# Patient Record
Sex: MALE | Race: WHITE | NOT HISPANIC OR LATINO | URBAN - METROPOLITAN AREA
[De-identification: names, ages, dates, MRNs, and addresses within clinical notes are randomized per-mention and may not be internally consistent; named-entity substitution may affect disease eponyms.]

---

## 2017-01-12 PROBLEM — Z00.00 ENCOUNTER FOR PREVENTIVE HEALTH EXAMINATION: Status: ACTIVE | Noted: 2017-01-12

## 2017-01-17 ENCOUNTER — OUTPATIENT (OUTPATIENT)
Dept: OUTPATIENT SERVICES | Facility: HOSPITAL | Age: 52
LOS: 1 days | End: 2017-01-17
Payer: MEDICARE

## 2017-01-17 VITALS
TEMPERATURE: 98 F | HEIGHT: 68 IN | SYSTOLIC BLOOD PRESSURE: 120 MMHG | DIASTOLIC BLOOD PRESSURE: 78 MMHG | WEIGHT: 315 LBS | RESPIRATION RATE: 16 BRPM | HEART RATE: 80 BPM

## 2017-01-17 DIAGNOSIS — Z98.890 OTHER SPECIFIED POSTPROCEDURAL STATES: Chronic | ICD-10-CM

## 2017-01-17 DIAGNOSIS — K42.9 UMBILICAL HERNIA WITHOUT OBSTRUCTION OR GANGRENE: ICD-10-CM

## 2017-01-17 DIAGNOSIS — K43.9 VENTRAL HERNIA WITHOUT OBSTRUCTION OR GANGRENE: ICD-10-CM

## 2017-01-17 DIAGNOSIS — Z01.818 ENCOUNTER FOR OTHER PREPROCEDURAL EXAMINATION: ICD-10-CM

## 2017-01-17 LAB
ANION GAP SERPL CALC-SCNC: 4 MMOL/L — LOW (ref 5–17)
BUN SERPL-MCNC: 21 MG/DL — SIGNIFICANT CHANGE UP (ref 7–23)
CALCIUM SERPL-MCNC: 9 MG/DL — SIGNIFICANT CHANGE UP (ref 8.4–10.5)
CHLORIDE SERPL-SCNC: 103 MMOL/L — SIGNIFICANT CHANGE UP (ref 96–108)
CO2 SERPL-SCNC: 32 MMOL/L — HIGH (ref 22–31)
CREAT SERPL-MCNC: 1.05 MG/DL — SIGNIFICANT CHANGE UP (ref 0.5–1.3)
GLUCOSE SERPL-MCNC: 177 MG/DL — HIGH (ref 70–99)
HBA1C BLD-MCNC: 7.4 % — HIGH (ref 4–5.6)
HCT VFR BLD CALC: 41.7 % — SIGNIFICANT CHANGE UP (ref 39–50)
HGB BLD-MCNC: 13.4 G/DL — SIGNIFICANT CHANGE UP (ref 13–17)
MCHC RBC-ENTMCNC: 28 PG — SIGNIFICANT CHANGE UP (ref 27–34)
MCHC RBC-ENTMCNC: 32.3 GM/DL — SIGNIFICANT CHANGE UP (ref 32–36)
MCV RBC AUTO: 86.6 FL — SIGNIFICANT CHANGE UP (ref 80–100)
PLATELET # BLD AUTO: 214 K/UL — SIGNIFICANT CHANGE UP (ref 150–400)
POTASSIUM SERPL-MCNC: 4.7 MMOL/L — SIGNIFICANT CHANGE UP (ref 3.5–5.3)
POTASSIUM SERPL-SCNC: 4.7 MMOL/L — SIGNIFICANT CHANGE UP (ref 3.5–5.3)
RBC # BLD: 4.81 M/UL — SIGNIFICANT CHANGE UP (ref 4.2–5.8)
RBC # FLD: 12.5 % — SIGNIFICANT CHANGE UP (ref 10.3–14.5)
SODIUM SERPL-SCNC: 139 MMOL/L — SIGNIFICANT CHANGE UP (ref 135–145)
WBC # BLD: 6.3 K/UL — SIGNIFICANT CHANGE UP (ref 3.8–10.5)
WBC # FLD AUTO: 6.3 K/UL — SIGNIFICANT CHANGE UP (ref 3.8–10.5)

## 2017-01-17 PROCEDURE — 36415 COLL VENOUS BLD VENIPUNCTURE: CPT

## 2017-01-17 PROCEDURE — 83036 HEMOGLOBIN GLYCOSYLATED A1C: CPT

## 2017-01-17 PROCEDURE — 93010 ELECTROCARDIOGRAM REPORT: CPT

## 2017-01-17 PROCEDURE — G0463: CPT

## 2017-01-17 PROCEDURE — 93005 ELECTROCARDIOGRAM TRACING: CPT

## 2017-01-17 PROCEDURE — 80048 BASIC METABOLIC PNL TOTAL CA: CPT

## 2017-01-17 PROCEDURE — 85027 COMPLETE CBC AUTOMATED: CPT

## 2017-01-17 NOTE — H&P PST ADULT - PMH
Abrasion of ankle    Cervical disc herniation  C2,3,4,6 and 7, under pain management and chiropractic care  Hyperlipidemia    Hypertension    Leg pain  left lower leg  Lumbar disc herniation  x2, unsure levels  Morbid obesity    MACO on CPAP  settings unknown  Prediabetes    Umbilical hernia
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
## 2017-01-17 NOTE — H&P PST ADULT - PROBLEM SELECTOR PLAN 1
umbilical hernia repair. medical clearance requested. lisinopril AM of surgery with sips of water. pepcid and surgical wash instructions reviewed and verbalized understanding

## 2017-01-17 NOTE — H&P PST ADULT - FAMILY HISTORY
Mother  Still living? No  Family history of pancreatic cancer, Age at diagnosis: Age Unknown     Father  Still living? Yes, Estimated age: 71-80  Family history of coronary artery bypass graft, Age at diagnosis: Age Unknown  Family history of type 2 diabetes mellitus, Age at diagnosis: Age Unknown

## 2017-01-17 NOTE — H&P PST ADULT - PSH
S/P carpal tunnel release  2008 left with ulnar nerve transposition  S/P carpal tunnel release  right 2012 with ulnar nerve transposition

## 2017-01-17 NOTE — H&P PST ADULT - MUSCULOSKELETAL
details… detailed exam no joint erythema/diminished strength/no joint warmth/bilateral upper extremities/no joint swelling/ROM intact/no calf tenderness

## 2017-01-17 NOTE — H&P PST ADULT - RS GEN PE MLT RESP DETAILS PC
clear to auscultation bilaterally/good air movement/no rhonchi/respirations non-labored/no intercostal retractions/airway patent/breath sounds equal/no chest wall tenderness/no wheezes/no rales

## 2017-01-17 NOTE — H&P PST ADULT - NEUROLOGICAL DETAILS
responds to verbal commands/responds to pain/bilateral upper extremities with 4/5 sensation/alert and oriented x 3/strength decreased

## 2017-01-19 NOTE — ASU DISCHARGE PLAN (ADULT/PEDIATRIC). - ACTIVITY LEVEL
no sports/gym/no tub baths/no heavy lifting no heavy lifting/abdominal binder while up and around/no tub baths/no sports/gym

## 2017-01-19 NOTE — ASU DISCHARGE PLAN (ADULT/PEDIATRIC). - MEDICATION SUMMARY - MEDICATIONS TO TAKE
I will START or STAY ON the medications listed below when I get home from the hospital:    acetaminophen-oxyCODONE 325 mg-5 mg oral tablet  -- 1 tab(s) by mouth every 6 hours, As Needed -for severe pain MDD:4  -- Caution federal law prohibits the transfer of this drug to any person other  than the person for whom it was prescribed.  May cause drowsiness.  Alcohol may intensify this effect.  Use care when operating dangerous machinery.  This prescription cannot be refilled.  This product contains acetaminophen.  Do not use  with any other product containing acetaminophen to prevent possible liver damage.  Using more of this medication than prescribed may cause serious breathing problems.    -- Indication: For moderate to severe incisional pain    aspirin 81 mg oral tablet  -- 1 tab(s) by mouth once a day  -- Indication: For continuing home medications    lisinopril 10 mg oral tablet  -- 1 tab(s) by mouth once a day  -- Indication: For continuing home medications    Allegra 180 mg oral tablet  -- 1 tab(s) by mouth once a day  -- Indication: For continuing home medications    atorvastatin 10 mg oral tablet  -- 1 tab(s) by mouth once a day  -- Indication: For continuing home medications    Glucosamine & Chondroitin with MSM  -- 2 tab(s) by mouth once a day  -- Indication: For continuing home medications    multivitamin  -- 1 tab(s) by mouth once a day  -- Indication: For continuing home medications

## 2017-01-19 NOTE — ASU DISCHARGE PLAN (ADULT/PEDIATRIC). - NOTIFY
Fever greater than 101/Persistent Nausea and Vomiting/Swelling that continues/Unable to Urinate/Bleeding that does not stop/Pain not relieved by Medications

## 2017-01-19 NOTE — ASU DISCHARGE PLAN (ADULT/PEDIATRIC). - MEDICATION SUMMARY - MEDICATIONS TO STOP TAKING
I will STOP taking the medications listed below when I get home from the hospital:    Percocet 10/325 oral tablet  -- 1 tab(s) by mouth every 6 hours, As Needed - for moderate pain    Pepcid 20 mg oral tablet  --  by mouth twice, preoperatively

## 2017-01-19 NOTE — ASU DISCHARGE PLAN (ADULT/PEDIATRIC). - SPECIAL INSTRUCTIONS
apply ice to operative site 20 minutes on and 20 minutes off for next 48 hours  Pain meds as per MD  Take an over the counter stool softener like Colace to avoid constipation while taking a narcotic for pain apply ice to operative site 20 minutes on and 20 minutes off for next 48 hours  Pain meds as per MD  Take an over the counter stool softener like Colace to avoid constipation while taking a narcotic for pain  wear abdominal binder when up and around

## 2017-01-22 RX ORDER — FAMOTIDINE 10 MG/ML
0 INJECTION INTRAVENOUS
Qty: 0 | Refills: 0 | COMMUNITY
Start: 2017-01-22 | End: 2017-01-23

## 2017-01-23 ENCOUNTER — OUTPATIENT (OUTPATIENT)
Dept: OUTPATIENT SERVICES | Facility: HOSPITAL | Age: 52
LOS: 1 days | End: 2017-01-23
Payer: MEDICARE

## 2017-01-23 VITALS
DIASTOLIC BLOOD PRESSURE: 56 MMHG | RESPIRATION RATE: 14 BRPM | HEART RATE: 82 BPM | OXYGEN SATURATION: 94 % | SYSTOLIC BLOOD PRESSURE: 101 MMHG

## 2017-01-23 VITALS
TEMPERATURE: 98 F | SYSTOLIC BLOOD PRESSURE: 142 MMHG | OXYGEN SATURATION: 97 % | RESPIRATION RATE: 17 BRPM | WEIGHT: 315 LBS | HEIGHT: 68 IN | DIASTOLIC BLOOD PRESSURE: 68 MMHG | HEART RATE: 67 BPM

## 2017-01-23 DIAGNOSIS — K43.9 VENTRAL HERNIA WITHOUT OBSTRUCTION OR GANGRENE: ICD-10-CM

## 2017-01-23 DIAGNOSIS — Z98.890 OTHER SPECIFIED POSTPROCEDURAL STATES: Chronic | ICD-10-CM

## 2017-01-23 DIAGNOSIS — Z01.818 ENCOUNTER FOR OTHER PREPROCEDURAL EXAMINATION: ICD-10-CM

## 2017-01-23 PROCEDURE — 49560: CPT

## 2017-01-23 PROCEDURE — C1781: CPT

## 2017-01-23 PROCEDURE — 49568: CPT

## 2017-01-23 RX ORDER — LISINOPRIL 2.5 MG/1
1 TABLET ORAL
Qty: 0 | Refills: 0 | COMMUNITY

## 2017-01-23 RX ORDER — OXYCODONE HYDROCHLORIDE 5 MG/1
1 TABLET ORAL
Qty: 28 | Refills: 0 | OUTPATIENT
Start: 2017-01-23 | End: 2017-01-30

## 2017-01-23 RX ORDER — ATORVASTATIN CALCIUM 80 MG/1
1 TABLET, FILM COATED ORAL
Qty: 0 | Refills: 0 | COMMUNITY

## 2017-01-23 RX ORDER — CEFAZOLIN SODIUM 1 G
3000 VIAL (EA) INJECTION ONCE
Qty: 0 | Refills: 0 | Status: COMPLETED | OUTPATIENT
Start: 2017-01-23 | End: 2017-01-23

## 2017-01-23 RX ORDER — SODIUM CHLORIDE 9 MG/ML
1000 INJECTION, SOLUTION INTRAVENOUS
Qty: 0 | Refills: 0 | Status: DISCONTINUED | OUTPATIENT
Start: 2017-01-23 | End: 2017-01-24

## 2017-01-23 RX ORDER — FEXOFENADINE HCL 30 MG
1 TABLET ORAL
Qty: 0 | Refills: 0 | COMMUNITY

## 2017-01-23 RX ORDER — ASPIRIN/CALCIUM CARB/MAGNESIUM 324 MG
1 TABLET ORAL
Qty: 0 | Refills: 0 | COMMUNITY

## 2017-01-23 RX ORDER — HYDROMORPHONE HYDROCHLORIDE 2 MG/ML
0.5 INJECTION INTRAMUSCULAR; INTRAVENOUS; SUBCUTANEOUS
Qty: 0 | Refills: 0 | Status: DISCONTINUED | OUTPATIENT
Start: 2017-01-23 | End: 2017-01-24

## 2017-01-23 RX ORDER — GLUCOSAMINE/MSM/CHONDROITIN A 750-375MG
2 TABLET ORAL
Qty: 0 | Refills: 0 | COMMUNITY

## 2017-01-23 RX ADMIN — HYDROMORPHONE HYDROCHLORIDE 0.5 MILLIGRAM(S): 2 INJECTION INTRAMUSCULAR; INTRAVENOUS; SUBCUTANEOUS at 11:47

## 2017-01-23 RX ADMIN — HYDROMORPHONE HYDROCHLORIDE 0.5 MILLIGRAM(S): 2 INJECTION INTRAMUSCULAR; INTRAVENOUS; SUBCUTANEOUS at 12:05

## 2017-01-23 RX ADMIN — HYDROMORPHONE HYDROCHLORIDE 0.5 MILLIGRAM(S): 2 INJECTION INTRAMUSCULAR; INTRAVENOUS; SUBCUTANEOUS at 12:06

## 2017-01-23 RX ADMIN — HYDROMORPHONE HYDROCHLORIDE 0.5 MILLIGRAM(S): 2 INJECTION INTRAMUSCULAR; INTRAVENOUS; SUBCUTANEOUS at 11:44

## 2017-01-23 RX ADMIN — SODIUM CHLORIDE 100 MILLILITER(S): 9 INJECTION, SOLUTION INTRAVENOUS at 11:47

## 2017-01-23 RX ADMIN — HYDROMORPHONE HYDROCHLORIDE 0.5 MILLIGRAM(S): 2 INJECTION INTRAMUSCULAR; INTRAVENOUS; SUBCUTANEOUS at 12:24

## 2017-01-23 RX ADMIN — HYDROMORPHONE HYDROCHLORIDE 0.5 MILLIGRAM(S): 2 INJECTION INTRAMUSCULAR; INTRAVENOUS; SUBCUTANEOUS at 11:50

## 2017-10-26 PROBLEM — I10 ESSENTIAL (PRIMARY) HYPERTENSION: Chronic | Status: ACTIVE | Noted: 2017-01-17

## 2017-10-26 PROBLEM — M50.20 OTHER CERVICAL DISC DISPLACEMENT, UNSPECIFIED CERVICAL REGION: Chronic | Status: ACTIVE | Noted: 2017-01-17

## 2017-10-26 PROBLEM — E66.01 MORBID (SEVERE) OBESITY DUE TO EXCESS CALORIES: Chronic | Status: ACTIVE | Noted: 2017-01-17

## 2017-10-26 PROBLEM — E78.5 HYPERLIPIDEMIA, UNSPECIFIED: Chronic | Status: ACTIVE | Noted: 2017-01-17

## 2017-10-26 PROBLEM — M51.26 OTHER INTERVERTEBRAL DISC DISPLACEMENT, LUMBAR REGION: Chronic | Status: ACTIVE | Noted: 2017-01-17

## 2017-10-26 PROBLEM — M79.606 PAIN IN LEG, UNSPECIFIED: Chronic | Status: ACTIVE | Noted: 2017-01-17

## 2017-10-26 PROBLEM — K42.9 UMBILICAL HERNIA WITHOUT OBSTRUCTION OR GANGRENE: Chronic | Status: ACTIVE | Noted: 2017-01-17

## 2017-10-26 PROBLEM — R73.03 PREDIABETES: Chronic | Status: ACTIVE | Noted: 2017-01-17

## 2017-10-26 PROBLEM — S90.519A: Chronic | Status: ACTIVE | Noted: 2017-01-17

## 2017-10-26 PROBLEM — G47.33 OBSTRUCTIVE SLEEP APNEA (ADULT) (PEDIATRIC): Chronic | Status: ACTIVE | Noted: 2017-01-17

## 2017-11-06 ENCOUNTER — APPOINTMENT (OUTPATIENT)
Dept: FAMILY MEDICINE | Facility: CLINIC | Age: 52
End: 2017-11-06
Payer: MEDICARE

## 2017-11-06 VITALS
DIASTOLIC BLOOD PRESSURE: 102 MMHG | HEIGHT: 67 IN | WEIGHT: 315 LBS | SYSTOLIC BLOOD PRESSURE: 134 MMHG | HEART RATE: 80 BPM | OXYGEN SATURATION: 98 % | RESPIRATION RATE: 16 BRPM | BODY MASS INDEX: 49.44 KG/M2

## 2017-11-06 DIAGNOSIS — Z86.39 PERSONAL HISTORY OF OTHER ENDOCRINE, NUTRITIONAL AND METABOLIC DISEASE: ICD-10-CM

## 2017-11-06 DIAGNOSIS — Z80.0 FAMILY HISTORY OF MALIGNANT NEOPLASM OF DIGESTIVE ORGANS: ICD-10-CM

## 2017-11-06 DIAGNOSIS — M50.20 OTHER CERVICAL DISC DISPLACEMENT, UNSPECIFIED CERVICAL REGION: ICD-10-CM

## 2017-11-06 DIAGNOSIS — M51.16 INTERVERTEBRAL DISC DISORDERS WITH RADICULOPATHY, LUMBAR REGION: ICD-10-CM

## 2017-11-06 DIAGNOSIS — Z78.9 OTHER SPECIFIED HEALTH STATUS: ICD-10-CM

## 2017-11-06 PROCEDURE — 99204 OFFICE O/P NEW MOD 45 MIN: CPT

## 2017-11-24 ENCOUNTER — RX RENEWAL (OUTPATIENT)
Age: 52
End: 2017-11-24

## 2017-12-19 ENCOUNTER — APPOINTMENT (OUTPATIENT)
Dept: FAMILY MEDICINE | Facility: CLINIC | Age: 52
End: 2017-12-19
Payer: MEDICARE

## 2017-12-19 VITALS
HEART RATE: 84 BPM | DIASTOLIC BLOOD PRESSURE: 92 MMHG | BODY MASS INDEX: 49.44 KG/M2 | HEIGHT: 67 IN | SYSTOLIC BLOOD PRESSURE: 142 MMHG | OXYGEN SATURATION: 99 % | WEIGHT: 315 LBS

## 2017-12-19 PROCEDURE — 99213 OFFICE O/P EST LOW 20 MIN: CPT

## 2018-01-27 ENCOUNTER — RX RENEWAL (OUTPATIENT)
Age: 53
End: 2018-01-27

## 2018-01-30 ENCOUNTER — RX RENEWAL (OUTPATIENT)
Age: 53
End: 2018-01-30

## 2018-03-17 ENCOUNTER — APPOINTMENT (OUTPATIENT)
Dept: FAMILY MEDICINE | Facility: CLINIC | Age: 53
End: 2018-03-17
Payer: MEDICARE

## 2018-03-17 VITALS
DIASTOLIC BLOOD PRESSURE: 90 MMHG | SYSTOLIC BLOOD PRESSURE: 140 MMHG | OXYGEN SATURATION: 98 % | RESPIRATION RATE: 12 BRPM | HEART RATE: 70 BPM

## 2018-03-17 PROCEDURE — 99213 OFFICE O/P EST LOW 20 MIN: CPT

## 2018-03-17 RX ORDER — LOSARTAN POTASSIUM 25 MG/1
25 TABLET, FILM COATED ORAL
Qty: 90 | Refills: 0 | Status: DISCONTINUED | COMMUNITY
Start: 2018-01-30 | End: 2018-03-17

## 2018-05-23 ENCOUNTER — APPOINTMENT (OUTPATIENT)
Dept: FAMILY MEDICINE | Facility: CLINIC | Age: 53
End: 2018-05-23

## 2018-05-24 ENCOUNTER — APPOINTMENT (OUTPATIENT)
Dept: FAMILY MEDICINE | Facility: CLINIC | Age: 53
End: 2018-05-24
Payer: MEDICARE

## 2018-05-24 VITALS
DIASTOLIC BLOOD PRESSURE: 88 MMHG | SYSTOLIC BLOOD PRESSURE: 126 MMHG | WEIGHT: 315 LBS | HEART RATE: 93 BPM | HEIGHT: 67 IN | OXYGEN SATURATION: 96 % | RESPIRATION RATE: 14 BRPM | BODY MASS INDEX: 49.44 KG/M2

## 2018-05-24 DIAGNOSIS — M16.11 UNILATERAL PRIMARY OSTEOARTHRITIS, RIGHT HIP: ICD-10-CM

## 2018-05-24 LAB — PODIATRY EVAL: NORMAL

## 2018-05-24 PROCEDURE — 99213 OFFICE O/P EST LOW 20 MIN: CPT

## 2018-05-24 NOTE — HISTORY OF PRESENT ILLNESS
[FreeTextEntry8] : +R hip pain radiates to R buttock, and down R leg  X worsening over last 3 years

## 2018-05-24 NOTE — PHYSICAL EXAM
[No Acute Distress] : no acute distress [Well-Appearing] : well-appearing [No JVD] : no jugular venous distention [No Lymphadenopathy] : no lymphadenopathy [No Respiratory Distress] : no respiratory distress  [Clear to Auscultation] : lungs were clear to auscultation bilaterally [Normal Rate] : normal rate  [Regular Rhythm] : with a regular rhythm [No Carotid Bruits] : no carotid bruits [Normal Posterior Cervical Nodes] : no posterior cervical lymphadenopathy [Normal Anterior Cervical Nodes] : no anterior cervical lymphadenopathy [de-identified] : obese [de-identified] : tender in posterior neck [de-identified] : tender in right hip, antalgic gait

## 2018-05-24 NOTE — REVIEW OF SYSTEMS
[Joint Pain] : joint pain [Joint Stiffness] : joint stiffness [Back Pain] : back pain [Negative] : Heme/Lymph [FreeTextEntry9] : neck pain

## 2018-07-26 ENCOUNTER — RX RENEWAL (OUTPATIENT)
Age: 53
End: 2018-07-26

## 2018-07-30 ENCOUNTER — APPOINTMENT (OUTPATIENT)
Dept: FAMILY MEDICINE | Facility: CLINIC | Age: 53
End: 2018-07-30
Payer: MEDICARE

## 2018-07-30 VITALS
RESPIRATION RATE: 14 BRPM | DIASTOLIC BLOOD PRESSURE: 76 MMHG | HEART RATE: 86 BPM | BODY MASS INDEX: 49.44 KG/M2 | SYSTOLIC BLOOD PRESSURE: 122 MMHG | HEIGHT: 67 IN | WEIGHT: 315 LBS

## 2018-07-30 PROCEDURE — 99213 OFFICE O/P EST LOW 20 MIN: CPT

## 2018-07-30 NOTE — HISTORY OF PRESENT ILLNESS
[FreeTextEntry8] : +red, +swollen, +painful R leg from calf down to ankle X 4 days  Seen at [facility] MD - venous doppler was negative. placed on avelox

## 2018-07-30 NOTE — REVIEW OF SYSTEMS
[Joint Pain] : joint pain [Joint Stiffness] : joint stiffness [Negative] : Heme/Lymph [de-identified] : redness of left lower leg

## 2018-07-30 NOTE — PHYSICAL EXAM
[No Acute Distress] : no acute distress [Well-Appearing] : well-appearing [No JVD] : no jugular venous distention [Supple] : supple [No Lymphadenopathy] : no lymphadenopathy [No Respiratory Distress] : no respiratory distress  [Clear to Auscultation] : lungs were clear to auscultation bilaterally [Normal Rate] : normal rate  [Regular Rhythm] : with a regular rhythm [No Carotid Bruits] : no carotid bruits [Normal Posterior Cervical Nodes] : no posterior cervical lymphadenopathy [Normal Anterior Cervical Nodes] : no anterior cervical lymphadenopathy [de-identified] : obese [de-identified] : chronic stasis dermatitis-  redness of left lowere leg, increased calor

## 2018-07-30 NOTE — ASSESSMENT
[FreeTextEntry1] : 1. For better coverage will change to levaquin 500 mg 1/day x 10 days and hold atorvastatin. Stressed needs to follow up with vascular.  2. Stressed needs to work on losing wt - very important.

## 2018-10-24 ENCOUNTER — RX RENEWAL (OUTPATIENT)
Age: 53
End: 2018-10-24

## 2018-11-02 ENCOUNTER — RX RENEWAL (OUTPATIENT)
Age: 53
End: 2018-11-02

## 2018-11-02 RX ORDER — LEVOFLOXACIN 500 MG/1
500 TABLET, FILM COATED ORAL
Qty: 10 | Refills: 0 | Status: DISCONTINUED | COMMUNITY
Start: 2018-07-30 | End: 2018-11-02

## 2018-11-02 RX ORDER — CEFADROXIL 500 MG/1
500 CAPSULE ORAL TWICE DAILY
Qty: 20 | Refills: 0 | Status: DISCONTINUED | COMMUNITY
Start: 2018-03-17 | End: 2018-11-02

## 2018-11-24 ENCOUNTER — RX RENEWAL (OUTPATIENT)
Age: 53
End: 2018-11-24

## 2018-12-22 ENCOUNTER — RX RENEWAL (OUTPATIENT)
Age: 53
End: 2018-12-22

## 2019-01-14 ENCOUNTER — RX RENEWAL (OUTPATIENT)
Age: 54
End: 2019-01-14

## 2019-01-15 ENCOUNTER — RX RENEWAL (OUTPATIENT)
Age: 54
End: 2019-01-15

## 2019-01-21 ENCOUNTER — APPOINTMENT (OUTPATIENT)
Dept: FAMILY MEDICINE | Facility: CLINIC | Age: 54
End: 2019-01-21
Payer: MEDICARE

## 2019-01-21 VITALS — HEART RATE: 79 BPM | DIASTOLIC BLOOD PRESSURE: 80 MMHG | OXYGEN SATURATION: 79 % | SYSTOLIC BLOOD PRESSURE: 128 MMHG

## 2019-01-21 PROCEDURE — 99213 OFFICE O/P EST LOW 20 MIN: CPT | Mod: 25

## 2019-01-21 PROCEDURE — 36415 COLL VENOUS BLD VENIPUNCTURE: CPT

## 2019-01-21 RX ORDER — MOXIFLOXACIN HYDROCHLORIDE TABLETS, 400 MG 400 MG/1
400 TABLET, FILM COATED ORAL
Qty: 7 | Refills: 0 | Status: DISCONTINUED | COMMUNITY
Start: 2018-07-29 | End: 2019-01-21

## 2019-01-21 RX ORDER — SILVER SULFADIAZINE 10 MG/G
1 CREAM TOPICAL
Qty: 400 | Refills: 0 | Status: DISCONTINUED | COMMUNITY
Start: 2017-11-22 | End: 2019-01-21

## 2019-01-21 NOTE — HISTORY OF PRESENT ILLNESS
[de-identified] : He needs a new pain management doctor - both herniated cervical and lumbar discs. Here for management of HTN, NIDDM and hyperlipidemia.  [FreeTextEntry1] : Pt presents for med check

## 2019-01-21 NOTE — PHYSICAL EXAM
[No Acute Distress] : no acute distress [Well-Appearing] : well-appearing [No JVD] : no jugular venous distention [No Lymphadenopathy] : no lymphadenopathy [No Respiratory Distress] : no respiratory distress  [Clear to Auscultation] : lungs were clear to auscultation bilaterally [Normal Rate] : normal rate  [Regular Rhythm] : with a regular rhythm [No Murmur] : no murmur heard [No Carotid Bruits] : no carotid bruits [Normal Posterior Cervical Nodes] : no posterior cervical lymphadenopathy [Normal Anterior Cervical Nodes] : no anterior cervical lymphadenopathy [Speech Grossly Normal] : speech grossly normal [Memory Grossly Normal] : memory grossly normal [Normal Affect] : the affect was normal [Alert and Oriented x3] : oriented to person, place, and time [Normal Mood] : the mood was normal [Normal Insight/Judgement] : insight and judgment were intact [de-identified] : obese [de-identified] : painful ROM [de-identified] : tender in lumbar spine, pain with ROM

## 2019-01-21 NOTE — HISTORY OF PRESENT ILLNESS
[de-identified] : He needs a new pain management doctor - both herniated cervical and lumbar discs. Here for management of HTN, NIDDM and hyperlipidemia.  [FreeTextEntry1] : Pt presents for med check

## 2019-01-21 NOTE — HEALTH RISK ASSESSMENT
[] : No [No falls in past year] : Patient reported no falls in the past year [0] : 2) Feeling down, depressed, or hopeless: Not at all (0) [de-identified] : rare [[id number]] : 0

## 2019-01-21 NOTE — PHYSICAL EXAM
[No Acute Distress] : no acute distress [Well-Appearing] : well-appearing [No JVD] : no jugular venous distention [No Lymphadenopathy] : no lymphadenopathy [No Respiratory Distress] : no respiratory distress  [Clear to Auscultation] : lungs were clear to auscultation bilaterally [Normal Rate] : normal rate  [Regular Rhythm] : with a regular rhythm [No Murmur] : no murmur heard [No Carotid Bruits] : no carotid bruits [Normal Posterior Cervical Nodes] : no posterior cervical lymphadenopathy [Normal Anterior Cervical Nodes] : no anterior cervical lymphadenopathy [Speech Grossly Normal] : speech grossly normal [Memory Grossly Normal] : memory grossly normal [Normal Affect] : the affect was normal [Alert and Oriented x3] : oriented to person, place, and time [Normal Mood] : the mood was normal [Normal Insight/Judgement] : insight and judgment were intact [de-identified] : obese [de-identified] : painful ROM [de-identified] : tender in lumbar spine, pain with ROM

## 2019-01-21 NOTE — PHYSICAL EXAM
[No Acute Distress] : no acute distress [Well-Appearing] : well-appearing [No JVD] : no jugular venous distention [No Lymphadenopathy] : no lymphadenopathy [No Respiratory Distress] : no respiratory distress  [Clear to Auscultation] : lungs were clear to auscultation bilaterally [Normal Rate] : normal rate  [Regular Rhythm] : with a regular rhythm [No Murmur] : no murmur heard [No Carotid Bruits] : no carotid bruits [Normal Posterior Cervical Nodes] : no posterior cervical lymphadenopathy [Normal Anterior Cervical Nodes] : no anterior cervical lymphadenopathy [Speech Grossly Normal] : speech grossly normal [Memory Grossly Normal] : memory grossly normal [Normal Affect] : the affect was normal [Alert and Oriented x3] : oriented to person, place, and time [Normal Mood] : the mood was normal [Normal Insight/Judgement] : insight and judgment were intact [de-identified] : obese [de-identified] : painful ROM [de-identified] : tender in lumbar spine, pain with ROM

## 2019-01-21 NOTE — ASSESSMENT
[FreeTextEntry1] : 1. HTN- BP - 128/80 is well controlled and all meds were renewed. 2. , 3. and 4. Diet/losing wt/medication use were all reviewed.  Lab done here to assess status.  Referral to pain management for his chronic cervical and lumbar pain.

## 2019-01-21 NOTE — HEALTH RISK ASSESSMENT
[] : No [No falls in past year] : Patient reported no falls in the past year [0] : 2) Feeling down, depressed, or hopeless: Not at all (0) [de-identified] : rare [[id number]] : 0

## 2019-01-21 NOTE — HISTORY OF PRESENT ILLNESS
[de-identified] : He needs a new pain management doctor - both herniated cervical and lumbar discs. Here for management of HTN, NIDDM and hyperlipidemia.  [FreeTextEntry1] : Pt presents for med check

## 2019-01-22 LAB
ALBUMIN SERPL ELPH-MCNC: 4.5 G/DL
ALP BLD-CCNC: 54 U/L
ALT SERPL-CCNC: 78 U/L
ANION GAP SERPL CALC-SCNC: 13 MMOL/L
AST SERPL-CCNC: 32 U/L
BASOPHILS # BLD AUTO: 0.01 K/UL
BASOPHILS NFR BLD AUTO: 0.2 %
BILIRUB SERPL-MCNC: 0.4 MG/DL
BUN SERPL-MCNC: 19 MG/DL
CALCIUM SERPL-MCNC: 9.2 MG/DL
CHLORIDE SERPL-SCNC: 101 MMOL/L
CHOLEST SERPL-MCNC: 163 MG/DL
CHOLEST/HDLC SERPL: 3.5 RATIO
CK SERPL-CCNC: 122 U/L
CO2 SERPL-SCNC: 27 MMOL/L
CREAT SERPL-MCNC: 0.85 MG/DL
CREAT SPEC-SCNC: 204 MG/DL
EOSINOPHIL # BLD AUTO: 0.3 K/UL
EOSINOPHIL NFR BLD AUTO: 4.6 %
GLUCOSE SERPL-MCNC: 124 MG/DL
HBA1C MFR BLD HPLC: 7.2 %
HCT VFR BLD CALC: 42.3 %
HDLC SERPL-MCNC: 46 MG/DL
HGB BLD-MCNC: 13.7 G/DL
IMM GRANULOCYTES NFR BLD AUTO: 0.3 %
LDLC SERPL CALC-MCNC: 88 MG/DL
LYMPHOCYTES # BLD AUTO: 2.04 K/UL
LYMPHOCYTES NFR BLD AUTO: 31.3 %
MAN DIFF?: NORMAL
MCHC RBC-ENTMCNC: 29.3 PG
MCHC RBC-ENTMCNC: 32.4 GM/DL
MCV RBC AUTO: 90.6 FL
MICROALBUMIN 24H UR DL<=1MG/L-MCNC: 5.6 MG/DL
MICROALBUMIN/CREAT 24H UR-RTO: 27 MG/G
MONOCYTES # BLD AUTO: 0.55 K/UL
MONOCYTES NFR BLD AUTO: 8.4 %
NEUTROPHILS # BLD AUTO: 3.59 K/UL
NEUTROPHILS NFR BLD AUTO: 55.2 %
PLATELET # BLD AUTO: 251 K/UL
POTASSIUM SERPL-SCNC: 4.3 MMOL/L
PROT SERPL-MCNC: 7.3 G/DL
PSA SERPL-MCNC: 1.18 NG/ML
RBC # BLD: 4.67 M/UL
RBC # FLD: 13 %
SODIUM SERPL-SCNC: 141 MMOL/L
TRIGL SERPL-MCNC: 145 MG/DL
TSH SERPL-ACNC: 3.88 UIU/ML
WBC # FLD AUTO: 6.51 K/UL

## 2019-02-19 ENCOUNTER — MEDICATION RENEWAL (OUTPATIENT)
Age: 54
End: 2019-02-19

## 2019-05-08 ENCOUNTER — RX RENEWAL (OUTPATIENT)
Age: 54
End: 2019-05-08

## 2019-05-14 ENCOUNTER — APPOINTMENT (OUTPATIENT)
Dept: FAMILY MEDICINE | Facility: CLINIC | Age: 54
End: 2019-05-14
Payer: MEDICARE

## 2019-05-14 VITALS
DIASTOLIC BLOOD PRESSURE: 90 MMHG | HEART RATE: 72 BPM | BODY MASS INDEX: 49.34 KG/M2 | RESPIRATION RATE: 12 BRPM | WEIGHT: 315 LBS | SYSTOLIC BLOOD PRESSURE: 136 MMHG | OXYGEN SATURATION: 98 %

## 2019-05-14 VITALS — DIASTOLIC BLOOD PRESSURE: 84 MMHG | SYSTOLIC BLOOD PRESSURE: 132 MMHG

## 2019-05-14 DIAGNOSIS — Z13.21 ENCOUNTER FOR SCREENING FOR NUTRITIONAL DISORDER: ICD-10-CM

## 2019-05-14 PROCEDURE — 36415 COLL VENOUS BLD VENIPUNCTURE: CPT

## 2019-05-14 PROCEDURE — 99213 OFFICE O/P EST LOW 20 MIN: CPT | Mod: 25

## 2019-05-14 RX ORDER — OXYCODONE HYDROCHLORIDE AND ACETAMINOPHEN 10; 325 MG/1; MG/1
10-325 TABLET ORAL
Refills: 0 | Status: DISCONTINUED | COMMUNITY
End: 2019-05-14

## 2019-05-14 NOTE — PHYSICAL EXAM
[Well-Appearing] : well-appearing [No Acute Distress] : no acute distress [Supple] : supple [No JVD] : no jugular venous distention [No Respiratory Distress] : no respiratory distress  [No Lymphadenopathy] : no lymphadenopathy [Clear to Auscultation] : lungs were clear to auscultation bilaterally [Regular Rhythm] : with a regular rhythm [Normal Rate] : normal rate  [No Murmur] : no murmur heard [No Carotid Bruits] : no carotid bruits [de-identified] : obese

## 2019-05-14 NOTE — HISTORY OF PRESENT ILLNESS
[FreeTextEntry1] : pt presents for b/p check  [de-identified] : Here for management of HTN, hyperlipidemia and NIDDM.

## 2019-05-14 NOTE — ASSESSMENT
[FreeTextEntry1] : . HTN- BP is stable on tx-  all meds were renewed.  2. and 3.  Diet/exercise/losing wt all reviewed . Lab done here to assess status.

## 2019-05-15 LAB
ALBUMIN SERPL ELPH-MCNC: 4.4 G/DL
ALP BLD-CCNC: 63 U/L
ALT SERPL-CCNC: 56 U/L
ANION GAP SERPL CALC-SCNC: 13 MMOL/L
AST SERPL-CCNC: 26 U/L
BASOPHILS # BLD AUTO: 0.02 K/UL
BASOPHILS NFR BLD AUTO: 0.3 %
BILIRUB SERPL-MCNC: 0.4 MG/DL
BUN SERPL-MCNC: 17 MG/DL
CALCIUM SERPL-MCNC: 8.9 MG/DL
CHLORIDE SERPL-SCNC: 101 MMOL/L
CHOLEST SERPL-MCNC: 163 MG/DL
CHOLEST/HDLC SERPL: 3.5 RATIO
CK SERPL-CCNC: 177 U/L
CO2 SERPL-SCNC: 28 MMOL/L
CREAT SERPL-MCNC: 0.97 MG/DL
EOSINOPHIL # BLD AUTO: 0.17 K/UL
EOSINOPHIL NFR BLD AUTO: 2.6 %
ESTIMATED AVERAGE GLUCOSE: 148 MG/DL
GLUCOSE SERPL-MCNC: 128 MG/DL
HBA1C MFR BLD HPLC: 6.8 %
HCT VFR BLD CALC: 44.6 %
HDLC SERPL-MCNC: 46 MG/DL
HGB BLD-MCNC: 14.1 G/DL
IMM GRANULOCYTES NFR BLD AUTO: 0.3 %
LDLC SERPL CALC-MCNC: 91 MG/DL
LYMPHOCYTES # BLD AUTO: 1.88 K/UL
LYMPHOCYTES NFR BLD AUTO: 29.3 %
MAN DIFF?: NORMAL
MCHC RBC-ENTMCNC: 29.3 PG
MCHC RBC-ENTMCNC: 31.6 GM/DL
MCV RBC AUTO: 92.7 FL
MONOCYTES # BLD AUTO: 0.56 K/UL
MONOCYTES NFR BLD AUTO: 8.7 %
NEUTROPHILS # BLD AUTO: 3.77 K/UL
NEUTROPHILS NFR BLD AUTO: 58.8 %
PLATELET # BLD AUTO: 247 K/UL
POTASSIUM SERPL-SCNC: 4.4 MMOL/L
PROT SERPL-MCNC: 7 G/DL
RBC # BLD: 4.81 M/UL
RBC # FLD: 12.5 %
SODIUM SERPL-SCNC: 142 MMOL/L
TRIGL SERPL-MCNC: 131 MG/DL
TSH SERPL-ACNC: 3.08 UIU/ML
WBC # FLD AUTO: 6.42 K/UL

## 2020-01-23 ENCOUNTER — RX RENEWAL (OUTPATIENT)
Age: 55
End: 2020-01-23

## 2020-02-20 ENCOUNTER — RX RENEWAL (OUTPATIENT)
Age: 55
End: 2020-02-20

## 2020-03-04 ENCOUNTER — APPOINTMENT (OUTPATIENT)
Dept: FAMILY MEDICINE | Facility: CLINIC | Age: 55
End: 2020-03-04
Payer: MEDICARE

## 2020-03-04 VITALS
HEIGHT: 67 IN | BODY MASS INDEX: 49.44 KG/M2 | OXYGEN SATURATION: 95 % | RESPIRATION RATE: 12 BRPM | HEART RATE: 81 BPM | DIASTOLIC BLOOD PRESSURE: 90 MMHG | SYSTOLIC BLOOD PRESSURE: 140 MMHG | WEIGHT: 315 LBS

## 2020-03-04 DIAGNOSIS — M54.5 LOW BACK PAIN: ICD-10-CM

## 2020-03-04 LAB
BILIRUB UR QL STRIP: NEGATIVE
CLARITY UR: CLEAR
COLLECTION METHOD: NORMAL
GLUCOSE UR-MCNC: NEGATIVE
HCG UR QL: 0.2 EU/DL
HGB UR QL STRIP.AUTO: NEGATIVE
KETONES UR-MCNC: NEGATIVE
LEUKOCYTE ESTERASE UR QL STRIP: NEGATIVE
NITRITE UR QL STRIP: NEGATIVE
PH UR STRIP: 6.5
PROT UR STRIP-MCNC: NEGATIVE
SP GR UR STRIP: 1.02

## 2020-03-04 PROCEDURE — 99213 OFFICE O/P EST LOW 20 MIN: CPT | Mod: 25

## 2020-03-04 PROCEDURE — 81003 URINALYSIS AUTO W/O SCOPE: CPT | Mod: QW

## 2020-03-04 NOTE — HISTORY OF PRESENT ILLNESS
[FreeTextEntry8] : pt c/o of pain in the back heading to the side started today  No new injuries,  right sided lower back and radiating around to the front.  History of herniated cervical discs and lumbar .  Felt fine yesterday and awoke with this pain.

## 2020-03-04 NOTE — PHYSICAL EXAM
[Normal] : normal rate, regular rhythm, normal S1 and S2 and no murmur heard [Normal Anterior Cervical Nodes] : no anterior cervical lymphadenopathy [Normal Posterior Cervical Nodes] : no posterior cervical lymphadenopathy [de-identified] : in significant distress, overweight [de-identified] : tender in right lumbar soft tissue

## 2020-03-04 NOTE — REVIEW OF SYSTEMS
[Abdominal Pain] : abdominal pain [Joint Pain] : joint pain [Joint Stiffness] : joint stiffness [Back Pain] : back pain [Negative] : Heme/Lymph

## 2020-03-04 NOTE — ASSESSMENT
[FreeTextEntry1] : UA- normal  but with presentation concern for renal colic- referral to [facility].

## 2020-04-09 ENCOUNTER — APPOINTMENT (OUTPATIENT)
Dept: FAMILY MEDICINE | Facility: CLINIC | Age: 55
End: 2020-04-09
Payer: MEDICARE

## 2020-04-09 VITALS
OXYGEN SATURATION: 98 % | SYSTOLIC BLOOD PRESSURE: 130 MMHG | HEART RATE: 91 BPM | HEIGHT: 67 IN | BODY MASS INDEX: 49.44 KG/M2 | RESPIRATION RATE: 14 BRPM | WEIGHT: 315 LBS | DIASTOLIC BLOOD PRESSURE: 80 MMHG

## 2020-04-09 DIAGNOSIS — K21.9 GASTRO-ESOPHAGEAL REFLUX DISEASE W/OUT ESOPHAGITIS: ICD-10-CM

## 2020-04-09 DIAGNOSIS — K58.9 IRRITABLE BOWEL SYNDROME W/OUT DIARRHEA: ICD-10-CM

## 2020-04-09 PROCEDURE — 90674 CCIIV4 VAC NO PRSV 0.5 ML IM: CPT

## 2020-04-09 PROCEDURE — 99213 OFFICE O/P EST LOW 20 MIN: CPT | Mod: 25

## 2020-04-09 PROCEDURE — G0008: CPT

## 2020-04-09 NOTE — REVIEW OF SYSTEMS
[Abdominal Pain] : abdominal pain [Heartburn] : heartburn [Joint Pain] : joint pain [Joint Stiffness] : joint stiffness [Back Pain] : back pain [Negative] : Heme/Lymph

## 2020-04-09 NOTE — PHYSICAL EXAM
[Soft] : abdomen soft [Non Tender] : non-tender [Normal Bowel Sounds] : normal bowel sounds [Normal Posterior Cervical Nodes] : no posterior cervical lymphadenopathy [Normal Anterior Cervical Nodes] : no anterior cervical lymphadenopathy [Normal] : affect was normal and insight and judgment were intact [de-identified] :  overweight [de-identified] : limited exam due to obesity [de-identified] : tender in right lumbar soft tissue

## 2020-04-09 NOTE — ASSESSMENT
[FreeTextEntry1] : 1. and 2.  Diet/ exercise/ need to lose wt  all reviewed.   I advised he use align 1 daily.  Also a trial of omeprazole  20 mg OTC.  He will call me with any changes/concerns/ not improving.

## 2020-04-09 NOTE — HISTORY OF PRESENT ILLNESS
[FreeTextEntry8] : pt c/o acid reflex,+gas, +abdominal pain X 6 days  epigastric pain,  burps  Also wants a flu shot.  GI issues present for a few weeks

## 2020-04-20 ENCOUNTER — RX RENEWAL (OUTPATIENT)
Age: 55
End: 2020-04-20

## 2020-04-24 ENCOUNTER — RX RENEWAL (OUTPATIENT)
Age: 55
End: 2020-04-24

## 2020-05-18 ENCOUNTER — RX RENEWAL (OUTPATIENT)
Age: 55
End: 2020-05-18

## 2020-07-16 ENCOUNTER — RX RENEWAL (OUTPATIENT)
Age: 55
End: 2020-07-16

## 2020-07-20 ENCOUNTER — RX RENEWAL (OUTPATIENT)
Age: 55
End: 2020-07-20

## 2020-08-17 ENCOUNTER — RX RENEWAL (OUTPATIENT)
Age: 55
End: 2020-08-17

## 2020-08-26 ENCOUNTER — APPOINTMENT (OUTPATIENT)
Dept: FAMILY MEDICINE | Facility: CLINIC | Age: 55
End: 2020-08-26
Payer: MEDICARE

## 2020-08-26 VITALS
HEIGHT: 68 IN | TEMPERATURE: 98.4 F | HEART RATE: 80 BPM | DIASTOLIC BLOOD PRESSURE: 76 MMHG | RESPIRATION RATE: 14 BRPM | OXYGEN SATURATION: 98 % | SYSTOLIC BLOOD PRESSURE: 126 MMHG | WEIGHT: 310 LBS | BODY MASS INDEX: 46.98 KG/M2

## 2020-08-26 DIAGNOSIS — M71.20 SYNOVIAL CYST OF POPLITEAL SPACE [BAKER], UNSPECIFIED KNEE: ICD-10-CM

## 2020-08-26 DIAGNOSIS — Z12.5 ENCOUNTER FOR SCREENING FOR MALIGNANT NEOPLASM OF PROSTATE: ICD-10-CM

## 2020-08-26 PROCEDURE — 36415 COLL VENOUS BLD VENIPUNCTURE: CPT

## 2020-08-26 PROCEDURE — 99214 OFFICE O/P EST MOD 30 MIN: CPT | Mod: 25

## 2020-08-26 NOTE — HISTORY OF PRESENT ILLNESS
[FreeTextEntry1] : pt presents for follow up [de-identified] : Patient reports today for follow up. Patient reports feeling well overall but does report L hand pain x 2.5 months. He notes pain is located on palmar surface near base of his index, middle, and ring finger, Reports difficulty flexing fingers and grasping things as a result. Denies numbness, tingling, trauma. Pt also reports seeing dr nix for knee pain, notes bakers cyst. \par

## 2020-08-26 NOTE — PHYSICAL EXAM
[No Edema] : there was no peripheral edema [Normal] : affect was normal and insight and judgment were intact [de-identified] : L hand: tenderness to palpation of palmar surface of index, middle and ring finger, no gorss trauma or ecchymosis, mildly more prominent than L, no distinct masses felt on palpation.

## 2020-08-26 NOTE — ASSESSMENT
[FreeTextEntry1] : Check labs drawn in office today for above assessed conditions. Labs to be resulted at the [facility] core lab.\par Medications renewed \par Pt advised to get diabetic eye exam and complete FIT testing \par Vitals and exam stable \par Check xray hand, tylenol prn pain \par \par

## 2020-08-29 LAB
ALBUMIN SERPL ELPH-MCNC: 4.4 G/DL
ALP BLD-CCNC: 57 U/L
ALT SERPL-CCNC: 59 U/L
ANION GAP SERPL CALC-SCNC: 13 MMOL/L
AST SERPL-CCNC: 29 U/L
BASOPHILS # BLD AUTO: 0.02 K/UL
BASOPHILS NFR BLD AUTO: 0.3 %
BILIRUB SERPL-MCNC: 0.2 MG/DL
BUN SERPL-MCNC: 21 MG/DL
CALCIUM SERPL-MCNC: 8.9 MG/DL
CHLORIDE SERPL-SCNC: 101 MMOL/L
CHOLEST SERPL-MCNC: 189 MG/DL
CHOLEST/HDLC SERPL: 4.3 RATIO
CO2 SERPL-SCNC: 27 MMOL/L
CREAT SERPL-MCNC: 0.89 MG/DL
EOSINOPHIL # BLD AUTO: 0.16 K/UL
EOSINOPHIL NFR BLD AUTO: 2.7 %
ESTIMATED AVERAGE GLUCOSE: 189 MG/DL
GLUCOSE SERPL-MCNC: 190 MG/DL
HBA1C MFR BLD HPLC: 8.2 %
HCT VFR BLD CALC: 45.3 %
HDLC SERPL-MCNC: 44 MG/DL
HGB BLD-MCNC: 14.1 G/DL
IMM GRANULOCYTES NFR BLD AUTO: 0.3 %
LDLC SERPL CALC-MCNC: 121 MG/DL
LYMPHOCYTES # BLD AUTO: 1.7 K/UL
LYMPHOCYTES NFR BLD AUTO: 28.8 %
MAN DIFF?: NORMAL
MCHC RBC-ENTMCNC: 29.3 PG
MCHC RBC-ENTMCNC: 31.1 GM/DL
MCV RBC AUTO: 94 FL
MONOCYTES # BLD AUTO: 0.49 K/UL
MONOCYTES NFR BLD AUTO: 8.3 %
NEUTROPHILS # BLD AUTO: 3.52 K/UL
NEUTROPHILS NFR BLD AUTO: 59.6 %
PLATELET # BLD AUTO: 246 K/UL
POTASSIUM SERPL-SCNC: 5 MMOL/L
PROT SERPL-MCNC: 7 G/DL
PSA SERPL-MCNC: 0.99 NG/ML
RBC # BLD: 4.82 M/UL
RBC # FLD: 12.9 %
SODIUM SERPL-SCNC: 141 MMOL/L
T4 SERPL-MCNC: 5.2 UG/DL
TRIGL SERPL-MCNC: 118 MG/DL
TSH SERPL-ACNC: 1.91 UIU/ML
WBC # FLD AUTO: 5.91 K/UL

## 2020-11-14 ENCOUNTER — RX RENEWAL (OUTPATIENT)
Age: 55
End: 2020-11-14

## 2020-12-15 ENCOUNTER — RX RENEWAL (OUTPATIENT)
Age: 55
End: 2020-12-15

## 2020-12-21 ENCOUNTER — NON-APPOINTMENT (OUTPATIENT)
Age: 55
End: 2020-12-21

## 2021-01-20 ENCOUNTER — APPOINTMENT (OUTPATIENT)
Dept: FAMILY MEDICINE | Facility: CLINIC | Age: 56
End: 2021-01-20
Payer: MEDICARE

## 2021-01-20 VITALS
RESPIRATION RATE: 14 BRPM | BODY MASS INDEX: 46.98 KG/M2 | HEART RATE: 76 BPM | OXYGEN SATURATION: 97 % | DIASTOLIC BLOOD PRESSURE: 88 MMHG | TEMPERATURE: 97.2 F | HEIGHT: 68 IN | SYSTOLIC BLOOD PRESSURE: 130 MMHG | WEIGHT: 310 LBS

## 2021-01-20 DIAGNOSIS — S83.207A UNSPECIFIED TEAR OF UNSPECIFIED MENISCUS, CURRENT INJURY, LEFT KNEE, INITIAL ENCOUNTER: ICD-10-CM

## 2021-01-20 PROCEDURE — 36415 COLL VENOUS BLD VENIPUNCTURE: CPT

## 2021-01-20 PROCEDURE — 99214 OFFICE O/P EST MOD 30 MIN: CPT | Mod: 25

## 2021-01-21 LAB
ESTIMATED AVERAGE GLUCOSE: 301 MG/DL
HBA1C MFR BLD HPLC: 12.1 %

## 2021-01-22 PROBLEM — S83.207A TEAR OF MENISCUS OF LEFT KNEE: Status: ACTIVE | Noted: 2021-01-22

## 2021-01-22 NOTE — RESULTS/DATA
[] : results reviewed [de-identified] : stable  [de-identified] : glucose 377, A1C 12.1 - pt advised to get endocrinology clearance  [de-identified] : stable, cleared by cardiology

## 2021-01-22 NOTE — ASSESSMENT
[No Further Testing Recommended] : no further testing recommended [Other: _____] : [unfilled] [Continue medications as is] : Continue current medications [As per surgery] : as per surgery [Patient NOT optimized for Surgery at this time] : Patient not optimized for surgery at this time [FreeTextEntry4] : Patient is not medically cleared

## 2021-01-22 NOTE — HISTORY OF PRESENT ILLNESS
[Diabetes] : diabetes [No Pertinent Cardiac History] : no history of aortic stenosis, atrial fibrillation, coronary artery disease, recent myocardial infarction, or implantable device/pacemaker [No Pertinent Pulmonary History] : no history of asthma, COPD, sleep apnea, or smoking [No Adverse Anesthesia Reaction] : no adverse anesthesia reaction in self or family member [(Patient denies any chest pain, claudication, dyspnea on exertion, orthopnea, palpitations or syncope)] : Patient denies any chest pain, claudication, dyspnea on exertion, orthopnea, palpitations or syncope [Chronic Anticoagulation] : no chronic anticoagulation [Chronic Kidney Disease] : no chronic kidney disease [FreeTextEntry1] : left meniscus repair   [FreeTextEntry3] : Dr. Kulwant Ayon @ [place]  [FreeTextEntry2] : 01/25/21 [FreeTextEntry4] : Patient present for medical clearance. Patient reports feeling well today despite ongoing left knee pain.

## 2021-01-22 NOTE — PHYSICAL EXAM
[No Focal Deficits] : no focal deficits [Normal Gait] : normal gait [Normal] : normal rate, regular rhythm, normal S1 and S2 and no murmur heard [No Carotid Bruits] : no carotid bruits [No Extremity Clubbing/Cyanosis] : no extremity clubbing/cyanosis [de-identified] : obese [de-identified] : BLE 1+edema

## 2021-02-28 ENCOUNTER — RX RENEWAL (OUTPATIENT)
Age: 56
End: 2021-02-28

## 2021-03-02 ENCOUNTER — APPOINTMENT (OUTPATIENT)
Dept: FAMILY MEDICINE | Facility: CLINIC | Age: 56
End: 2021-03-02
Payer: MEDICARE

## 2021-03-02 VITALS
DIASTOLIC BLOOD PRESSURE: 76 MMHG | BODY MASS INDEX: 47.74 KG/M2 | WEIGHT: 315 LBS | OXYGEN SATURATION: 96 % | RESPIRATION RATE: 14 BRPM | HEIGHT: 68 IN | HEART RATE: 74 BPM | TEMPERATURE: 97.9 F | SYSTOLIC BLOOD PRESSURE: 134 MMHG

## 2021-03-02 DIAGNOSIS — N23 UNSPECIFIED RENAL COLIC: ICD-10-CM

## 2021-03-02 DIAGNOSIS — M25.542 PAIN IN JOINTS OF LEFT HAND: ICD-10-CM

## 2021-03-02 DIAGNOSIS — Z92.29 PERSONAL HISTORY OF OTHER DRUG THERAPY: ICD-10-CM

## 2021-03-02 DIAGNOSIS — H81.09 MENIERE'S DISEASE, UNSPECIFIED EAR: ICD-10-CM

## 2021-03-02 DIAGNOSIS — Z13.1 ENCOUNTER FOR SCREENING FOR DIABETES MELLITUS: ICD-10-CM

## 2021-03-02 DIAGNOSIS — M25.562 PAIN IN LEFT KNEE: ICD-10-CM

## 2021-03-02 DIAGNOSIS — M25.571 PAIN IN RIGHT ANKLE AND JOINTS OF RIGHT FOOT: ICD-10-CM

## 2021-03-02 DIAGNOSIS — Z13.21 ENCOUNTER FOR SCREENING FOR NUTRITIONAL DISORDER: ICD-10-CM

## 2021-03-02 DIAGNOSIS — Z13.220 ENCOUNTER FOR SCREENING FOR LIPOID DISORDERS: ICD-10-CM

## 2021-03-02 DIAGNOSIS — Z13.0 ENCOUNTER FOR SCREENING FOR DISEASES OF THE BLOOD AND BLOOD-FORMING ORGANS AND CERTAIN DISORDERS INVOLVING THE IMMUNE MECHANISM: ICD-10-CM

## 2021-03-02 DIAGNOSIS — Z12.11 ENCOUNTER FOR SCREENING FOR MALIGNANT NEOPLASM OF COLON: ICD-10-CM

## 2021-03-02 DIAGNOSIS — Z13.228 ENCOUNTER FOR SCREENING FOR OTHER METABOLIC DISORDERS: ICD-10-CM

## 2021-03-02 DIAGNOSIS — Z13.29 ENCOUNTER FOR SCREENING FOR OTHER SUSPECTED ENDOCRINE DISORDER: ICD-10-CM

## 2021-03-02 DIAGNOSIS — E78.5 HYPERLIPIDEMIA, UNSPECIFIED: ICD-10-CM

## 2021-03-02 DIAGNOSIS — S83.207A UNSPECIFIED TEAR OF UNSPECIFIED MENISCUS, CURRENT INJURY, LEFT KNEE, INITIAL ENCOUNTER: ICD-10-CM

## 2021-03-02 PROCEDURE — 99214 OFFICE O/P EST MOD 30 MIN: CPT

## 2021-03-02 RX ORDER — TAMSULOSIN HYDROCHLORIDE 0.4 MG/1
0.4 CAPSULE ORAL
Qty: 7 | Refills: 0 | Status: DISCONTINUED | COMMUNITY
Start: 2020-03-09 | End: 2021-03-02

## 2021-03-02 RX ORDER — OXYCODONE AND ACETAMINOPHEN 5; 325 MG/1; MG/1
5-325 TABLET ORAL
Qty: 12 | Refills: 0 | Status: DISCONTINUED | COMMUNITY
Start: 2020-03-06 | End: 2021-03-02

## 2021-03-02 RX ORDER — MECLIZINE HYDROCHLORIDE 25 MG/1
25 TABLET ORAL
Qty: 21 | Refills: 0 | Status: DISCONTINUED | COMMUNITY
Start: 2017-12-18 | End: 2021-03-02

## 2021-03-02 RX ORDER — SITAGLIPTIN 25 MG/1
25 TABLET, FILM COATED ORAL DAILY
Qty: 90 | Refills: 0 | Status: DISCONTINUED | COMMUNITY
Start: 2020-08-29 | End: 2021-03-02

## 2021-03-02 RX ORDER — TRAMADOL HYDROCHLORIDE 50 MG/1
50 TABLET, COATED ORAL
Qty: 90 | Refills: 0 | Status: DISCONTINUED | COMMUNITY
Start: 2018-04-26 | End: 2021-03-02

## 2021-03-02 RX ORDER — ATORVASTATIN CALCIUM 10 MG/1
10 TABLET, FILM COATED ORAL
Qty: 90 | Refills: 0 | Status: DISCONTINUED | COMMUNITY
Start: 2020-11-14 | End: 2021-03-02

## 2021-03-03 PROBLEM — Z13.29 SCREENING FOR THYROID DISORDER: Status: RESOLVED | Noted: 2019-01-21 | Resolved: 2021-03-03

## 2021-03-03 PROBLEM — Z92.29 HISTORY OF INFLUENZA VACCINATION: Status: RESOLVED | Noted: 2020-04-09 | Resolved: 2021-03-03

## 2021-03-03 PROBLEM — Z13.21 ENCOUNTER FOR VITAMIN DEFICIENCY SCREENING: Status: RESOLVED | Noted: 2019-05-14 | Resolved: 2021-03-03

## 2021-03-03 PROBLEM — Z13.220 SCREENING FOR LIPID DISORDERS: Status: RESOLVED | Noted: 2019-01-21 | Resolved: 2021-03-03

## 2021-03-03 PROBLEM — M25.542 JOINT PAIN IN FINGERS OF LEFT HAND: Status: RESOLVED | Noted: 2020-08-26 | Resolved: 2021-03-03

## 2021-03-03 PROBLEM — Z13.228 SCREENING FOR METABOLIC DISORDER: Status: RESOLVED | Noted: 2019-01-21 | Resolved: 2021-03-03

## 2021-03-03 PROBLEM — Z13.0 SCREENING FOR DEFICIENCY ANEMIA: Status: RESOLVED | Noted: 2019-01-21 | Resolved: 2021-03-03

## 2021-03-03 PROBLEM — Z12.11 COLON CANCER SCREENING: Status: RESOLVED | Noted: 2020-08-26 | Resolved: 2021-03-03

## 2021-03-03 PROBLEM — N23 RENAL COLIC ON RIGHT SIDE: Status: RESOLVED | Noted: 2020-03-04 | Resolved: 2021-03-03

## 2021-03-03 PROBLEM — Z13.1 SCREENING FOR DIABETES MELLITUS: Status: RESOLVED | Noted: 2019-01-21 | Resolved: 2021-03-03

## 2021-03-03 PROBLEM — H81.09 LABYRINTHINE VERTIGO: Status: RESOLVED | Noted: 2017-12-19 | Resolved: 2021-03-03

## 2021-03-03 RX ORDER — DULAGLUTIDE 1.5 MG/.5ML
1.5 INJECTION, SOLUTION SUBCUTANEOUS
Qty: 6 | Refills: 0 | Status: DISCONTINUED | COMMUNITY
Start: 2021-01-22

## 2021-03-03 NOTE — PLAN
[FreeTextEntry1] : Chronic conditions: \par \par 1. DM type II in obese male: \par stable, continue current medications \par f/u with endo as directed \par will need a podiatry \par \par 2. HTN and HLD: \par stable, continue current medications as directed \par \par 3. LT knee pain: \par planned surgery 3/15/21 \par \par RTO as routine for follow up.

## 2021-03-03 NOTE — ASSESSMENT
[Patient Optimized for Surgery] : Patient optimized for surgery [No Further Testing Recommended] : no further testing recommended [Modify anti-platelet treatment prior to procedure] : Modify anti-platelet treatment prior to procedure [Continue medications as is] : Continue current medications [As per surgery] : as per surgery [FreeTextEntry4] : 55 yr old male is medically cleared and intermediate risk for planned procedure on 5/15/21 for Left knee meniscus repair. \par Cardiologist clearance note has been reviewed. \par presurgical labs and EKG have been reviewed \par Denies history of Myocardial infraction, CVA and BEDOYA \par May continue to take prescribed medications as directed. \par NPO after midnight prior to surgery \par may take morning BP medications with sips of water morning of procedure \par Advised not to take ASA and or NSAIDs prior  to surgery\par  [FreeTextEntry6] : hold ASA 81 mg 5-7 days prior to planned surgery

## 2021-03-03 NOTE — HISTORY OF PRESENT ILLNESS
[No Pertinent Cardiac History] : no history of aortic stenosis, atrial fibrillation, coronary artery disease, recent myocardial infarction, or implantable device/pacemaker [No Pertinent Pulmonary History] : no history of asthma, COPD, sleep apnea, or smoking [Sleep Apnea] : sleep apnea [No Adverse Anesthesia Reaction] : no adverse anesthesia reaction in self or family member [Diabetes] : diabetes [(Patient denies any chest pain, claudication, dyspnea on exertion, orthopnea, palpitations or syncope)] : Patient denies any chest pain, claudication, dyspnea on exertion, orthopnea, palpitations or syncope [Moderate (4-6 METs)] : Moderate (4-6 METs) [Aortic Stenosis] : no aortic stenosis [Atrial Fibrillation] : no atrial fibrillation [Coronary Artery Disease] : no coronary artery disease [Recent Myocardial Infarction] : no recent myocardial infarction [Implantable Device/Pacemaker] : no implantable device/pacemaker [Asthma] : no asthma [COPD] : no COPD [Smoker] : not a smoker [Family Member] : no family member with adverse anesthesia reaction/sudden death [Self] : no previous adverse anesthesia reaction [Chronic Anticoagulation] : no chronic anticoagulation [Chronic Kidney Disease] : no chronic kidney disease [FreeTextEntry1] : Left knee meniscus repair  [FreeTextEntry2] : 3/15/2021 [FreeTextEntry3] :  at [facility]  [FreeTextEntry4] : Patient presents for medical clearance. knee meniscus and arthritis clean up. States his endocrinologist has been monitoring his blood sugar daily. He reports improved glycemic control and diet. \par \par  [FreeTextEntry5] : DM is being managed by Dr. Ritu foss.

## 2021-03-03 NOTE — REVIEW OF SYSTEMS
[Joint Pain] : joint pain [Joint Stiffness] : joint stiffness [Joint Swelling] : joint swelling [Negative] : Heme/Lymph [Muscle Pain] : no muscle pain [Muscle Weakness] : no muscle weakness [Back Pain] : no back pain

## 2021-03-03 NOTE — PHYSICAL EXAM
[No Acute Distress] : no acute distress [Well Developed] : well developed [Normal Outer Ear/Nose] : the outer ears and nose were normal in appearance [No JVD] : no jugular venous distention [No Lymphadenopathy] : no lymphadenopathy [Supple] : supple [Thyroid Normal, No Nodules] : the thyroid was normal and there were no nodules present [No Respiratory Distress] : no respiratory distress  [No Accessory Muscle Use] : no accessory muscle use [Clear to Auscultation] : lungs were clear to auscultation bilaterally [Normal Rate] : normal rate  [Regular Rhythm] : with a regular rhythm [Normal S1, S2] : normal S1 and S2 [No Murmur] : no murmur heard [No Carotid Bruits] : no carotid bruits [No Edema] : there was no peripheral edema [Soft] : abdomen soft [Non Tender] : non-tender [Non-distended] : non-distended [No Masses] : no abdominal mass palpated [Normal Bowel Sounds] : normal bowel sounds [Normal Posterior Cervical Nodes] : no posterior cervical lymphadenopathy [Normal Anterior Cervical Nodes] : no anterior cervical lymphadenopathy [Normal Affect] : the affect was normal [Alert and Oriented x3] : oriented to person, place, and time [Normal Insight/Judgement] : insight and judgment were intact [de-identified] : obese male  [de-identified] : enlarged neck  [de-identified] : bilateral chronic venous stasis

## 2021-03-15 ENCOUNTER — RESULT REVIEW (OUTPATIENT)
Age: 56
End: 2021-03-15

## 2021-07-23 ENCOUNTER — RX RENEWAL (OUTPATIENT)
Age: 56
End: 2021-07-23

## 2021-11-13 ENCOUNTER — RX RENEWAL (OUTPATIENT)
Age: 56
End: 2021-11-13

## 2021-12-11 ENCOUNTER — RX CHANGE (OUTPATIENT)
Age: 56
End: 2021-12-11

## 2021-12-24 ENCOUNTER — RX RENEWAL (OUTPATIENT)
Age: 56
End: 2021-12-24

## 2021-12-29 ENCOUNTER — RESULT REVIEW (OUTPATIENT)
Age: 56
End: 2021-12-29

## 2022-01-05 ENCOUNTER — APPOINTMENT (OUTPATIENT)
Dept: FAMILY MEDICINE | Facility: CLINIC | Age: 57
End: 2022-01-05

## 2022-01-05 ENCOUNTER — APPOINTMENT (OUTPATIENT)
Dept: FAMILY MEDICINE | Facility: CLINIC | Age: 57
End: 2022-01-05
Payer: MEDICARE

## 2022-01-05 VITALS
WEIGHT: 315 LBS | BODY MASS INDEX: 47.74 KG/M2 | HEART RATE: 94 BPM | TEMPERATURE: 98 F | SYSTOLIC BLOOD PRESSURE: 128 MMHG | RESPIRATION RATE: 14 BRPM | OXYGEN SATURATION: 97 % | DIASTOLIC BLOOD PRESSURE: 80 MMHG | HEIGHT: 68 IN

## 2022-01-05 DIAGNOSIS — E66.01 MORBID (SEVERE) OBESITY DUE TO EXCESS CALORIES: Chronic | ICD-10-CM

## 2022-01-05 DIAGNOSIS — Z01.818 ENCOUNTER FOR OTHER PREPROCEDURAL EXAMINATION: ICD-10-CM

## 2022-01-05 PROCEDURE — 99214 OFFICE O/P EST MOD 30 MIN: CPT

## 2022-01-05 NOTE — HISTORY OF PRESENT ILLNESS
[No Pertinent Cardiac History] : no history of aortic stenosis, atrial fibrillation, coronary artery disease, recent myocardial infarction, or implantable device/pacemaker [Sleep Apnea] : sleep apnea [No Adverse Anesthesia Reaction] : no adverse anesthesia reaction in self or family member [Diabetes] : diabetes [(Patient denies any chest pain, claudication, dyspnea on exertion, orthopnea, palpitations or syncope)] : Patient denies any chest pain, claudication, dyspnea on exertion, orthopnea, palpitations or syncope [Moderate (4-6 METs)] : Moderate (4-6 METs) [Asthma] : no asthma [COPD] : no COPD [Smoker] : not a smoker [Chronic Anticoagulation] : no chronic anticoagulation [Chronic Kidney Disease] : no chronic kidney disease [FreeTextEntry1] : Bariatric Surgery  [FreeTextEntry2] : 01/10/2022 [FreeTextEntry3] : Dr. Estrella @ [facility] Fax #[phone] & [phone] [FreeTextEntry4] : 55 yo male, hx of DM, HLD, HTN who presents for PST for upcoming bariatric surgery. \par He already saw cardio, was cleared by cardio. \par Has no acute complaints today.

## 2022-01-05 NOTE — PLAN
[FreeTextEntry1] : Pre-Operative Evaluation for upcoming gastric sleeve \par chronic morbid obesity as primary indication for procedure\par pre-op cbc, bmp reviewed from [facility] 1/3/2022\par EKG noted and documented above \par cardiology consult from 12/3/2021 noted\par patient was cleared by cardiology to proceed for planned surgery at that time\par Class II RCRI risk. \par \par DM \par chronic, stable\par comorbid condition complicating his obesity\par 7.9 recent a1c as stated by patient\par c/w metformin at this time\par goal to see improvement in DM s/p gastric sleeve procedure\par encouraged to follow up post operatively \par \par Patient agrees with plan, all further questions answered during encounter.\par

## 2022-01-05 NOTE — ASSESSMENT
[High Risk Surgery - Intraperitoneal, Intrathoracic or Supringuinal Vascular Procedures] : High Risk Surgery - Intraperitoneal, Intrathoracic or Supringuinal Vascular Procedures - No (0) [Ischemic Heart Disease] : Ischemic Heart Disease - No (0) [Congestive Heart Failure] : Congestive Heart Failure - No (0) [Prior Cerebrovascular Accident or TIA] : Prior Cerebrovascular Accident or TIA - No (0) [Creatinine >= 2mg/dL (1 Point)] : Creatinine >= 2mg/dL - Yes (1) [Insulin-dependent Diabetic (1 Point)] : Insulin-dependent Diabetic - No (0) [1] : 1 , RCRI Class: II, Risk of Post-Op Cardiac Complications: 6.0%, 95% CI for Risk Estimate: 4.9% - 7.4% [Patient Optimized for Surgery] : Patient optimized for surgery [No Further Testing Recommended] : no further testing recommended [Continue medications as is] : Continue current medications [As per surgery] : as per surgery

## 2022-01-05 NOTE — PHYSICAL EXAM
[Normal] : no respiratory distress, lungs were clear to auscultation bilaterally and no accessory muscle use [Normal Rate] : normal rate  [Regular Rhythm] : with a regular rhythm [Normal S1, S2] : normal S1 and S2 [Soft] : abdomen soft [Non Tender] : non-tender [Coordination Grossly Intact] : coordination grossly intact [No Focal Deficits] : no focal deficits [de-identified] : chronic b/l venous stasis changes  [de-identified] : obese

## 2022-01-10 ENCOUNTER — RESULT REVIEW (OUTPATIENT)
Age: 57
End: 2022-01-10

## 2022-01-11 ENCOUNTER — NON-APPOINTMENT (OUTPATIENT)
Age: 57
End: 2022-01-11

## 2022-01-14 ENCOUNTER — APPOINTMENT (OUTPATIENT)
Dept: FAMILY MEDICINE | Facility: CLINIC | Age: 57
End: 2022-01-14
Payer: MEDICARE

## 2022-01-14 VITALS
OXYGEN SATURATION: 96 % | WEIGHT: 315 LBS | RESPIRATION RATE: 14 BRPM | HEIGHT: 68 IN | HEART RATE: 80 BPM | BODY MASS INDEX: 47.74 KG/M2 | TEMPERATURE: 98 F | DIASTOLIC BLOOD PRESSURE: 100 MMHG | SYSTOLIC BLOOD PRESSURE: 160 MMHG

## 2022-01-14 DIAGNOSIS — R03.0 ELEVATED BLOOD-PRESSURE READING, W/OUT DIAGNOSIS OF HYPERTENSION: ICD-10-CM

## 2022-01-14 DIAGNOSIS — L03.90 CELLULITIS, UNSPECIFIED: ICD-10-CM

## 2022-01-14 DIAGNOSIS — Z01.818 ENCOUNTER FOR OTHER PREPROCEDURAL EXAMINATION: ICD-10-CM

## 2022-01-14 DIAGNOSIS — L03.119 CELLULITIS OF UNSPECIFIED PART OF LIMB: ICD-10-CM

## 2022-01-14 DIAGNOSIS — Z09 ENCOUNTER FOR FOLLOW-UP EXAMINATION AFTER COMPLETED TREATMENT FOR CONDITIONS OTHER THAN MALIGNANT NEOPLASM: ICD-10-CM

## 2022-01-14 LAB — GLUCOSE BLDC GLUCOMTR-MCNC: 111

## 2022-01-14 PROCEDURE — 99496 TRANSJ CARE MGMT HIGH F2F 7D: CPT | Mod: 25

## 2022-01-14 PROCEDURE — 82962 GLUCOSE BLOOD TEST: CPT

## 2022-01-14 NOTE — REVIEW OF SYSTEMS
[Recent Change In Weight] : ~T recent weight change [Abdominal Pain] : abdominal pain [Negative] : Respiratory [FreeTextEntry2] : lost 7 pounds

## 2022-01-14 NOTE — HEALTH RISK ASSESSMENT
[Never] : Never [No] : In the past 12 months have you used drugs other than those required for medical reasons? No [No falls in past year] : Patient reported no falls in the past year [0] : 2) Feeling down, depressed, or hopeless: Not at all (0) [PHQ-2 Negative - No further assessment needed] : PHQ-2 Negative - No further assessment needed [HIV test declined] : HIV test declined [Hepatitis C test declined] : Hepatitis C test declined [[id number]] : 0 [Change in mental status noted] : No change in mental status noted [Language] : denies difficulty with language [Behavior] : denies difficulty with behavior [Learning/Retaining New Information] : denies difficulty learning/retaining new information [Handling Complex Tasks] : denies difficulty handling complex tasks [Reasoning] : denies difficulty with reasoning [Spatial Ability and Orientation] : denies difficulty with spatial ability and orientation

## 2022-01-14 NOTE — HISTORY OF PRESENT ILLNESS
[FreeTextEntry1] : patient presents for blood pressure check  [Post-hospitalization from ___ Hospital] : Post-hospitalization from [unfilled] Hospital [Admitted on: ___] : The patient was admitted on [unfilled] [Discharged on ___] : discharged on [unfilled] [Discharge Summary] : discharge summary [Patient Contacted By: ____] : and contacted by [unfilled] [Pertinent Labs] : pertinent labs [Discharge Med List] : discharge medication list [FreeTextEntry2] : sp bariatric sleeve 1/10/22 dr Bo . It went very well. He was walking. His bp 128/84 on losartan. He was given parameters for his blood sugar he is only to take medicine if blood sugar is only 150. He has the steffany sensor  it was 208. He is clear liquid diet broth jello sugar free low salt.  No cp,  achy,  epigastric area .it is red and they are aware. \par \par they made me take a picture so i can compare\par \par they started me on amlodipine  5 mg \par I am also on lovenox for 21 d \par I am on omeprazole \par + burping no vomiting + diarrhea

## 2022-01-14 NOTE — ASSESSMENT
[FreeTextEntry1] : sp bariatric surgery , elevated bp, cellulitis vs seroma\par Discussion and interpretation of previous tests , external notes( labs, radiology, specialist  , patient verbalized understanding.\par Prescription drug management\par cont norasc, and all meds\par monitor symptoms. check sonogram ro seroma\par check blood sugar follow parameters\par use heat to the area of the abdomen swelling and tenderness.\par \par

## 2022-01-14 NOTE — PHYSICAL EXAM
[Normal] : normal rate, regular rhythm, normal S1 and S2 and no murmur heard [de-identified] : abdominal scars mild red above umbilicus.  tender hard not warm

## 2022-01-14 NOTE — PHYSICAL EXAM
[Normal] : normal rate, regular rhythm, normal S1 and S2 and no murmur heard [de-identified] : abdominal scars mild red above umbilicus.  tender hard not warm

## 2022-01-14 NOTE — HEALTH RISK ASSESSMENT
[Never] : Never [No] : In the past 12 months have you used drugs other than those required for medical reasons? No [No falls in past year] : Patient reported no falls in the past year [0] : 2) Feeling down, depressed, or hopeless: Not at all (0) [PHQ-2 Negative - No further assessment needed] : PHQ-2 Negative - No further assessment needed [HIV test declined] : HIV test declined [Hepatitis C test declined] : Hepatitis C test declined [[id number]] : 0 [Change in mental status noted] : No change in mental status noted [Language] : denies difficulty with language [Behavior] : denies difficulty with behavior [Learning/Retaining New Information] : denies difficulty learning/retaining new information [Handling Complex Tasks] : denies difficulty handling complex tasks [Reasoning] : denies difficulty with reasoning [Spatial Ability and Orientation] : denies difficulty with spatial ability and orientation

## 2022-02-04 ENCOUNTER — RX RENEWAL (OUTPATIENT)
Age: 57
End: 2022-02-04

## 2022-02-07 ENCOUNTER — RX RENEWAL (OUTPATIENT)
Age: 57
End: 2022-02-07

## 2022-08-20 NOTE — ASU PATIENT PROFILE, ADULT - MEDICATION ADMINISTRATION INFO, PROFILE
Encounter Date: 8/20/2022       History     Chief Complaint   Patient presents with    COVID-19 Concerns     Patient c/o throat and joint pain which started 2 days ago      43-year-old male presenting with body aches, headache, sore throat, cough since yesterday.  Patient took home COVID test which was positive but wanted to come have another 1 done here for confirmation.  No shortness of breath, abdominal pain, vomiting or diarrhea.        Review of patient's allergies indicates:   Allergen Reactions    Norvasc [amlodipine] Other (See Comments)     Erectile dysfunction     Past Medical History:   Diagnosis Date    Allergy     Anxiety     Hydrocele, unspecified     post hydrocelectomy    Transaminasemia      Past Surgical History:   Procedure Laterality Date    CIRCUMCISION, PRIMARY      hydrocelectomy      INJECTION OF ANESTHETIC AGENT AROUND MEDIAL BRANCH NERVES INNERVATING LUMBAR FACET JOINT Bilateral 8/25/2021    Procedure: Block-nerve-medial branch-lumbar; Levels- L4-5 and L5-S1;  Surgeon: Estefany Singh MD;  Location: [facility];  Service: Pain Management;  Laterality: Bilateral;  No pacemaker. No blood thinners.      Family History   Problem Relation Age of Onset    Fibromyalgia Mother     Arthritis Mother     Heart disease Paternal Grandmother      Social History     Tobacco Use    Smoking status: Never Smoker    Smokeless tobacco: Never Used   Substance Use Topics    Alcohol use: Yes     Comment: occasional    Drug use: No     Review of Systems   Constitutional: Negative for fever.   HENT: Positive for congestion and sore throat.    Respiratory: Positive for cough. Negative for shortness of breath.    Cardiovascular: Negative for chest pain.   Gastrointestinal: Negative for nausea.   Genitourinary: Negative for dysuria.   Musculoskeletal: Positive for myalgias. Negative for back pain.   Skin: Negative for rash.   Neurological: Positive for headaches. Negative for weakness.    Hematological: Does not bruise/bleed easily.       Physical Exam     Initial Vitals [08/20/22 0857]   BP Pulse Resp Temp SpO2   128/78 90 18 99.6 °F (37.6 °C) 99 %      MAP       --         Physical Exam    Nursing note and vitals reviewed.  HENT:   Head: Atraumatic.   Mouth/Throat: Oropharynx is clear and moist.   Eyes: Conjunctivae and EOM are normal.   Neck: Neck supple.   Normal range of motion.  Pulmonary/Chest: Breath sounds normal.   Musculoskeletal:      Cervical back: Normal range of motion and neck supple.     Neurological: He is alert and oriented to person, place, and time.         ED Course   Procedures  Labs Reviewed   SARS-COV-2 RNA AMPLIFICATION, QUAL - Abnormal; Notable for the following components:       Result Value    SARS-CoV-2 RNA, Amplification, Qual Positive (*)     All other components within normal limits    Narrative:     Is the patient symptomatic?->Yes          Imaging Results    None          Medications - No data to display  Medical Decision Making:   Initial Assessment:   43-year-old male with COVID.  No indication for admission.  Does not meet criteria for Paxlovid.  Plan for isolation at home, symptomatic treatment.                      Clinical Impression:   Final diagnoses:  [U07.1] COVID-19 (Primary)          ED Disposition Condition    Discharge Stable        ED Prescriptions     None        Follow-up Information     Follow up With Specialties Details Why Contact Info    Rosa Martinez MD Internal Medicine Schedule an appointment as soon as possible for a visit in 1 week  [address] [facility] [id number] [phone]             Jerzy Bedolla MD  08/20/22 4450     no concerns

## 2023-03-14 NOTE — H&P PST ADULT - LOCATION OF BACK PAIN
You were seen in the ED for chest pain  You had bloodwork, EKG, chest xrays, all showing no significant abnormalities  Please follow up with your primary care physician and/or cardiologist within the next 1-2 weeks for continued management of your condition  Please come back to the ED if your symptoms return or worsen or if you develop uncontrollable pain, shortness of breath  Thank you very much for utilizing the ED this afternoon  cervical spine/lumbar spine

## 2023-05-31 ENCOUNTER — APPOINTMENT (OUTPATIENT)
Dept: FAMILY MEDICINE | Facility: CLINIC | Age: 58
End: 2023-05-31
Payer: MEDICARE

## 2023-05-31 VITALS
TEMPERATURE: 97 F | HEIGHT: 68 IN | OXYGEN SATURATION: 98 % | RESPIRATION RATE: 15 BRPM | SYSTOLIC BLOOD PRESSURE: 124 MMHG | WEIGHT: 260 LBS | DIASTOLIC BLOOD PRESSURE: 98 MMHG | BODY MASS INDEX: 39.4 KG/M2 | HEART RATE: 84 BPM

## 2023-05-31 DIAGNOSIS — R22.0 LOCALIZED SWELLING, MASS AND LUMP, HEAD: ICD-10-CM

## 2023-05-31 PROCEDURE — 99213 OFFICE O/P EST LOW 20 MIN: CPT

## 2023-05-31 NOTE — HISTORY OF PRESENT ILLNESS
[FreeTextEntry8] : DANIEL SIMPSON is a 57 year male who presents today May 31, 2023 with right sided facial lump x 1 week\par \par Patient noticed a small hard bump on the right side of his face x 1 week. He denies any injury to the face. He denies any pain. \par He is a retired [employer] .

## 2023-05-31 NOTE — PHYSICAL EXAM
[Normal] : no acute distress, well nourished, well developed and well-appearing [Coordination Grossly Intact] : coordination grossly intact [No Focal Deficits] : no focal deficits [Normal Affect] : the affect was normal [Alert and Oriented x3] : oriented to person, place, and time [Normal Insight/Judgement] : insight and judgment were intact [de-identified] : small hard mass like lump palpated on right side of face preauricular

## 2023-05-31 NOTE — ASSESSMENT
[FreeTextEntry1] : DANIEL SIMPSON is a 57 year male who presents today May 31, 2023 with a small hard mass palpated on right side of face preauricular.\par \par Facial Lump/Mass\par - Possible abscess ?\par - Will order US of Head \par - Consider CT scan if no improvement

## 2023-07-27 ENCOUNTER — APPOINTMENT (OUTPATIENT)
Dept: FAMILY MEDICINE | Facility: CLINIC | Age: 58
End: 2023-07-27
Payer: MEDICARE

## 2023-07-27 VITALS
DIASTOLIC BLOOD PRESSURE: 90 MMHG | HEIGHT: 68 IN | SYSTOLIC BLOOD PRESSURE: 130 MMHG | WEIGHT: 260 LBS | RESPIRATION RATE: 12 BRPM | HEART RATE: 69 BPM | BODY MASS INDEX: 39.4 KG/M2 | OXYGEN SATURATION: 98 %

## 2023-07-27 DIAGNOSIS — R13.10 DYSPHAGIA, UNSPECIFIED: ICD-10-CM

## 2023-07-27 DIAGNOSIS — R07.9 CHEST PAIN, UNSPECIFIED: ICD-10-CM

## 2023-07-27 LAB — HBA1C MFR BLD HPLC: 6

## 2023-07-27 PROCEDURE — 36415 COLL VENOUS BLD VENIPUNCTURE: CPT

## 2023-07-27 PROCEDURE — 83036 HEMOGLOBIN GLYCOSYLATED A1C: CPT | Mod: QW

## 2023-07-27 PROCEDURE — 99214 OFFICE O/P EST MOD 30 MIN: CPT | Mod: 25

## 2023-07-27 NOTE — PHYSICAL EXAM
[Normal] : normal rate, regular rhythm, normal S1 and S2 and no murmur heard [de-identified] : sternum discomfort

## 2023-07-27 NOTE — ASSESSMENT
[FreeTextEntry1] : pt presents with dysphagia esophagitis. for 1 1/2 d. he is leaving town for a week . hx of bariatric gastric sleeve. He is advised to to a GERD diet. He will not take advil , or drink a lot of coffee . He will take protonix 40 mg bid . He is scheduled for CT chest no contrast to view esophagus and chest. [facility] does not do ugi series anymore. 8/7/23 1 am scheduled appt w gastro for consultation for endoscopy. Follow up  results. \par Labs drawn/ specimens obtained  in office on this date of service  for evaluation of   assessed conditions -   esophagitis   to be run at [facility].\par \par Activity as tolerated\par Go to ER if worse chest pain or shortness of breath, vomiting. \par

## 2023-07-27 NOTE — HISTORY OF PRESENT ILLNESS
[FreeTextEntry8] : pt c/o trouble swallowing x 2 days \par 1 1/2 d ago it felt like when yo bight food and it goes down slowly and I could feel it in the throat and in my esophagus . i tried antiacid and it didn’t work. I feel it all the time no matter when I eat or drink or even when I bend over. I also fell asleep reading and nauseus woke me up. \par If he eats just enough I am semi eating. \par I eat  small meals. i drink same amount of coffee. I take advil 3-4 d per week 600 mg.

## 2023-07-28 LAB
ALBUMIN SERPL ELPH-MCNC: 4.5 G/DL
ALP BLD-CCNC: 80 U/L
ALT SERPL-CCNC: 18 U/L
AMYLASE/CREAT SERPL: 45 U/L
ANION GAP SERPL CALC-SCNC: 12 MMOL/L
AST SERPL-CCNC: 16 U/L
BILIRUB SERPL-MCNC: 0.4 MG/DL
BUN SERPL-MCNC: 16 MG/DL
CALCIUM SERPL-MCNC: 8.6 MG/DL
CHLORIDE SERPL-SCNC: 105 MMOL/L
CHOLEST SERPL-MCNC: 223 MG/DL
CO2 SERPL-SCNC: 28 MMOL/L
CREAT SERPL-MCNC: 0.96 MG/DL
EGFR: 91 ML/MIN/1.73M2
ESTIMATED AVERAGE GLUCOSE: 128 MG/DL
FERRITIN SERPL-MCNC: 150 NG/ML
FOLATE SERPL-MCNC: 9.1 NG/ML
GLUCOSE SERPL-MCNC: 93 MG/DL
HBA1C MFR BLD HPLC: 6.1 %
HDLC SERPL-MCNC: 58 MG/DL
IRON SATN MFR SERPL: 13 %
IRON SERPL-MCNC: 40 UG/DL
LDLC SERPL CALC-MCNC: 148 MG/DL
LPL SERPL-CCNC: 41 U/L
MAGNESIUM SERPL-MCNC: 2.1 MG/DL
NONHDLC SERPL-MCNC: 165 MG/DL
POTASSIUM SERPL-SCNC: 4.6 MMOL/L
PROT SERPL-MCNC: 7 G/DL
PSA SERPL-MCNC: 1.68 NG/ML
SODIUM SERPL-SCNC: 144 MMOL/L
T3 SERPL-MCNC: 88 NG/DL
T3FREE SERPL-MCNC: 2.55 PG/ML
T4 FREE SERPL-MCNC: 1.1 NG/DL
TIBC SERPL-MCNC: 297 UG/DL
TRIGL SERPL-MCNC: 97 MG/DL
TSH SERPL-ACNC: 2.58 UIU/ML
UIBC SERPL-MCNC: 257 UG/DL
VIT B12 SERPL-MCNC: 529 PG/ML

## 2023-07-28 RX ORDER — SULFAMETHOXAZOLE AND TRIMETHOPRIM 800; 160 MG/1; MG/1
800-160 TABLET ORAL TWICE DAILY
Qty: 20 | Refills: 0 | Status: COMPLETED | COMMUNITY
Start: 2022-01-14 | End: 2023-07-28

## 2023-07-28 RX ORDER — ATORVASTATIN CALCIUM 20 MG/1
20 TABLET, FILM COATED ORAL
Qty: 90 | Refills: 3 | Status: COMPLETED | COMMUNITY
Start: 2020-02-20 | End: 2023-07-28

## 2023-07-28 RX ORDER — METFORMIN HYDROCHLORIDE 1000 MG/1
1000 TABLET, COATED ORAL
Qty: 180 | Refills: 0 | Status: DISCONTINUED | COMMUNITY
Start: 2018-11-24 | End: 2023-07-28

## 2023-07-28 RX ORDER — LOSARTAN POTASSIUM 50 MG/1
50 TABLET, FILM COATED ORAL
Qty: 90 | Refills: 0 | Status: COMPLETED | COMMUNITY
Start: 2018-07-26 | End: 2023-07-28

## 2023-07-28 RX ORDER — ENOXAPARIN SODIUM 100 MG/ML
40 INJECTION SUBCUTANEOUS
Refills: 0 | Status: COMPLETED | COMMUNITY
End: 2023-07-28

## 2023-07-28 RX ORDER — OMEPRAZOLE 40 MG/1
40 CAPSULE, DELAYED RELEASE ORAL
Refills: 0 | Status: COMPLETED | COMMUNITY
End: 2023-07-28

## 2023-07-28 RX ORDER — AMLODIPINE BESYLATE 5 MG/1
5 TABLET ORAL
Refills: 0 | Status: COMPLETED | COMMUNITY
End: 2023-07-28

## 2024-05-06 ENCOUNTER — APPOINTMENT (OUTPATIENT)
Dept: NEUROLOGY | Facility: CLINIC | Age: 59
End: 2024-05-06
Payer: MEDICARE

## 2024-05-06 VITALS
OXYGEN SATURATION: 98 % | HEIGHT: 68 IN | SYSTOLIC BLOOD PRESSURE: 166 MMHG | HEART RATE: 70 BPM | DIASTOLIC BLOOD PRESSURE: 91 MMHG | BODY MASS INDEX: 39.4 KG/M2 | WEIGHT: 260 LBS

## 2024-05-06 DIAGNOSIS — G43.019 MIGRAINE W/OUT AURA, INTRACTABLE, W/OUT STATUS MIGRAINOSUS: ICD-10-CM

## 2024-05-06 DIAGNOSIS — Z78.9 OTHER SPECIFIED HEALTH STATUS: ICD-10-CM

## 2024-05-06 PROCEDURE — 99203 OFFICE O/P NEW LOW 30 MIN: CPT

## 2024-05-06 PROCEDURE — G2211 COMPLEX E/M VISIT ADD ON: CPT

## 2024-05-06 RX ORDER — SUMATRIPTAN 100 MG/1
100 TABLET, FILM COATED ORAL
Qty: 20 | Refills: 3 | Status: ACTIVE | COMMUNITY
Start: 2024-05-06 | End: 1900-01-01

## 2024-05-06 RX ORDER — FLASH GLUCOSE SENSOR
KIT MISCELLANEOUS
Qty: 2 | Refills: 0 | Status: DISCONTINUED | COMMUNITY
Start: 2021-01-22 | End: 2024-05-06

## 2024-05-06 RX ORDER — ASPIRIN 81 MG
81 TABLET, DELAYED RELEASE (ENTERIC COATED) ORAL
Refills: 0 | Status: DISCONTINUED | COMMUNITY
End: 2024-05-06

## 2024-05-06 RX ORDER — PANTOPRAZOLE 40 MG/1
40 TABLET, DELAYED RELEASE ORAL
Qty: 30 | Refills: 0 | Status: DISCONTINUED | COMMUNITY
Start: 2023-07-27 | End: 2024-05-06
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
## 2024-05-06 NOTE — DISCUSSION/SUMMARY
[FreeTextEntry1] : 58-year-old male presents to clinic with complaint of headaches x 16 years.  Concerning for migraines without aura.  My plan is as follows:  1.  Request MRI brain noncontrast to evaluate for stroke, mass, mass effect, hydrocephalus, hemorrhage, cerebral volume loss and hippocampal atrophy and other contributing factors for the recent change in pattern with increased severity and frequency of headaches.  2. I have requested the following labs as part of the HA work-up which includes CBC, CMP, Magnesium (RBC), TSH w/FT4 reflex, Vitamin B12 (serum) and Vitamin D (1,25-dihydroxy).  3.  Discussed lifestyle modifications over-the-counter supplements and requested patient maintain headache diary as directed  4.  Start sumatriptan 100 mg at onset may repeat in 2 hours  mg max weekly dose 3 times per week.  5.  Return to clinic in 6 weeks for follow-up evaluation

## 2024-05-06 NOTE — REVIEW OF SYSTEMS
[Fever] : no fever [Chills] : no chills [Feeling Tired] : not feeling tired [Sleep Disturbances] : no sleep disturbances [Anxiety] : no anxiety [Depression] : no depression [Confused or Disoriented] : no confusion [Memory Lapses or Loss] : no memory loss [Decr. Concentrating Ability] : no decrease in concentrating ability [Difficulty with Language] : no ~M difficulty with language [Facial Weakness] : no facial weakness [Arm Weakness] : arm weakness [Hand Weakness] :  hand weakness [Leg Weakness] : leg weakness [Numbness] : numbness [Tingling] : tingling [Seizures] : no convulsions [Dizziness] : dizziness [Fainting] : no fainting [Lightheadedness] : no lightheadedness [Vertigo] : vertigo [Migraine Headache] : migraine headaches [Difficulty Walking] : no difficulty walking [Inability to Walk] : able to walk [Eye Pain] : no eye pain [Loss Of Hearing] : no hearing loss [Heart Rate Is Slow] : the heart rate was not slow [Heart Rate Is Fast] : the heart rate was not fast [Chest Pain] : no chest pain [Palpitations] : no palpitations [Shortness Of Breath] : no shortness of breath [SOB on Exertion] : no shortness of breath during exertion [Abdominal Pain] : no abdominal pain [Vomiting] : no vomiting [Constipation] : no constipation [Diarrhea] : no diarrhea [Dysuria] : no dysuria [Arthralgias] : arthralgias [Joint Pain] : joint pain [Skin Lesions] : no skin lesions [Skin Wound] : no skin wound [Easy Bleeding] : no tendency for easy bleeding [Easy Bruising] : no tendency for easy bruising

## 2024-05-06 NOTE — PHYSICAL EXAM
[General Appearance - Alert] : alert [General Appearance - In No Acute Distress] : in no acute distress [General Appearance - Well Nourished] : well nourished [General Appearance - Well Developed] : well developed [Oriented To Time, Place, And Person] : oriented to person, place, and time [Affect] : the affect was normal [Mood] : the mood was normal [Cranial Nerves Optic (II)] : visual acuity intact bilaterally,  visual fields full to confrontation, pupils equal round and reactive to light [Cranial Nerves Oculomotor (III)] : extraocular motion intact [Cranial Nerves Trigeminal (V)] : facial sensation intact symmetrically [Cranial Nerves Facial (VII)] : face symmetrical [Cranial Nerves Vestibulocochlear (VIII)] : hearing was intact bilaterally [Cranial Nerves Glossopharyngeal (IX)] : tongue and palate midline [Cranial Nerves Accessory (XI - Cranial And Spinal)] : head turning and shoulder shrug symmetric [Cranial Nerves Hypoglossal (XII)] : there was no tongue deviation with protrusion [Motor Tone] : muscle tone was normal in all four extremities [Involuntary Movements] : no involuntary movements were seen [No Muscle Atrophy] : normal bulk in all four extremities [Motor Handedness Right-Handed] : the patient is right hand dominant [Paresis Pronator Drift Right-Sided] : no pronator drift on the right [Paresis Pronator Drift Left-Sided] : no pronator drift on the left [Motor Strength Upper Extremities Bilaterally] : there was weakness in both upper extremities [Motor Strength Lower Extremities Bilaterally] : there was weakness in both lower extremities [5] : shoulder adduction 5/5 [Hand Weakness Right] : the hand  was weak [Hand Weakness Left] : the hand  was weak [4] : ankle plantar flexion 4/5 [Sensation Tactile Decrease] : light touch was intact [Proprioception] : proprioception was intact [Tremor] : no tremor present [1+] : Ankle jerk left 1+ [FreeTextEntry9] : Patella not tested due top knee pads BLE [PERRL With Normal Accommodation] : pupils were equal in size, round, reactive to light, with normal accommodation [Extraocular Movements] : extraocular movements were intact [Hearing Threshold Finger Rub Not [place]] : hearing was normal [Neck Appearance] : the appearance of the neck was normal [Neck Cervical Mass (___cm)] : no neck mass was observed [Exaggerated Use Of Accessory Muscles For Inspiration] : no accessory muscle use [Auscultation Breath Sounds / Voice Sounds] : lungs were clear to auscultation bilaterally [Heart Rate And Rhythm] : heart rate was normal and rhythm regular [Heart Sounds] : normal S1 and S2 [Murmurs] : no murmurs [Abdomen Soft] : soft [Abdomen Tenderness] : non-tender [Antalgic Gait Bilateral] : antalgic bilaterally [Skin Turgor] : normal skin turgor [] : no rash [Skin Lesions] : no skin lesions [FreeTextEntry1] : (+) vascular changes with 2+ pitting edema and discoloriation BLE

## 2024-06-11 ENCOUNTER — APPOINTMENT (OUTPATIENT)
Dept: FAMILY MEDICINE | Facility: CLINIC | Age: 59
End: 2024-06-11
Payer: MEDICARE

## 2024-06-11 VITALS
HEART RATE: 64 BPM | HEIGHT: 68 IN | RESPIRATION RATE: 14 BRPM | TEMPERATURE: 99 F | WEIGHT: 260 LBS | BODY MASS INDEX: 39.4 KG/M2 | OXYGEN SATURATION: 98 % | SYSTOLIC BLOOD PRESSURE: 158 MMHG | DIASTOLIC BLOOD PRESSURE: 92 MMHG

## 2024-06-11 DIAGNOSIS — E11.65 TYPE 2 DIABETES MELLITUS WITH HYPERGLYCEMIA: ICD-10-CM

## 2024-06-11 DIAGNOSIS — M25.571 PAIN IN RIGHT ANKLE AND JOINTS OF RIGHT FOOT: ICD-10-CM

## 2024-06-11 DIAGNOSIS — M25.562 PAIN IN RIGHT KNEE: ICD-10-CM

## 2024-06-11 DIAGNOSIS — M25.561 PAIN IN RIGHT KNEE: ICD-10-CM

## 2024-06-11 DIAGNOSIS — I10 ESSENTIAL (PRIMARY) HYPERTENSION: ICD-10-CM

## 2024-06-11 DIAGNOSIS — M25.572 PAIN IN RIGHT ANKLE AND JOINTS OF RIGHT FOOT: ICD-10-CM

## 2024-06-11 DIAGNOSIS — R93.7 ABNORMAL FINDINGS ON DIAGNOSTIC IMAGING OF OTHER PARTS OF MUSCULOSKELETAL SYSTEM: ICD-10-CM

## 2024-06-11 PROCEDURE — G2211 COMPLEX E/M VISIT ADD ON: CPT

## 2024-06-11 PROCEDURE — 36415 COLL VENOUS BLD VENIPUNCTURE: CPT

## 2024-06-11 PROCEDURE — 99214 OFFICE O/P EST MOD 30 MIN: CPT

## 2024-06-11 NOTE — PHYSICAL EXAM
[Normal] : normal rate, regular rhythm, normal S1 and S2 and no murmur heard [de-identified] : lower extremity edema dark skin

## 2024-06-11 NOTE — HEALTH RISK ASSESSMENT
[No] : No [Never (0 pts)] : Never (0 points) [No falls in past year] : Patient reported no falls in the past year [de-identified] : neuro [de-identified] : tennis [de-identified] : bariatric [Never] : Never
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
## 2024-06-11 NOTE — ASSESSMENT
[FreeTextEntry1] : OA ankle knee and right hip. He had xrays done and SSDI said there was something wrong He has terrible pain check mr ankle bilateral check mr knees bilateral  check mr right hip no contrast  NIDDM   sp bariatric surgery  .he lost 90 pounds and is no longer on meds. his last aic was 6. he has had not lost weight in a while despite exercise and reduced calorie dieet Labs drawn/ specimens obtained  in office on this date of service  for evaluation of   assessed conditions -    aic lipid   to be run at [facility].

## 2024-06-12 LAB
ALBUMIN SERPL ELPH-MCNC: 4.2 G/DL
ALP BLD-CCNC: 69 U/L
ALT SERPL-CCNC: 23 U/L
ANION GAP SERPL CALC-SCNC: 12 MMOL/L
AST SERPL-CCNC: 21 U/L
BASOPHILS # BLD AUTO: 0.01 K/UL
BASOPHILS NFR BLD AUTO: 0.2 %
BILIRUB SERPL-MCNC: 0.5 MG/DL
BUN SERPL-MCNC: 17 MG/DL
CALCIUM SERPL-MCNC: 8.5 MG/DL
CHLORIDE SERPL-SCNC: 105 MMOL/L
CHOLEST SERPL-MCNC: 238 MG/DL
CO2 SERPL-SCNC: 24 MMOL/L
CREAT SERPL-MCNC: 0.94 MG/DL
EGFR: 93 ML/MIN/1.73M2
EOSINOPHIL # BLD AUTO: 0.09 K/UL
EOSINOPHIL NFR BLD AUTO: 1.9 %
ESTIMATED AVERAGE GLUCOSE: 126 MG/DL
GLUCOSE SERPL-MCNC: 103 MG/DL
HBA1C MFR BLD HPLC: 6 %
HCT VFR BLD CALC: 44.9 %
HDLC SERPL-MCNC: 59 MG/DL
HGB BLD-MCNC: 14.3 G/DL
IMM GRANULOCYTES NFR BLD AUTO: 0.2 %
LDLC SERPL CALC-MCNC: 165 MG/DL
LYMPHOCYTES # BLD AUTO: 1.79 K/UL
LYMPHOCYTES NFR BLD AUTO: 37.2 %
MAN DIFF?: NORMAL
MCHC RBC-ENTMCNC: 29.2 PG
MCHC RBC-ENTMCNC: 31.8 GM/DL
MCV RBC AUTO: 91.8 FL
MONOCYTES # BLD AUTO: 0.41 K/UL
MONOCYTES NFR BLD AUTO: 8.5 %
NEUTROPHILS # BLD AUTO: 2.5 K/UL
NEUTROPHILS NFR BLD AUTO: 52 %
NONHDLC SERPL-MCNC: 180 MG/DL
PLATELET # BLD AUTO: 227 K/UL
POTASSIUM SERPL-SCNC: 4.2 MMOL/L
PROT SERPL-MCNC: 6.8 G/DL
PSA SERPL-MCNC: 1.85 NG/ML
RBC # BLD: 4.89 M/UL
RBC # FLD: 13.1 %
SODIUM SERPL-SCNC: 142 MMOL/L
TRIGL SERPL-MCNC: 84 MG/DL
TSH SERPL-ACNC: 2.09 UIU/ML
WBC # FLD AUTO: 4.81 K/UL

## 2024-06-13 PROBLEM — R93.7 ABNORMAL X-RAY OF LUMBAR SPINE: Status: ACTIVE | Noted: 2024-06-13

## 2024-07-15 ENCOUNTER — APPOINTMENT (OUTPATIENT)
Dept: NEUROLOGY | Facility: CLINIC | Age: 59
End: 2024-07-15

## 2024-07-15 VITALS
HEART RATE: 62 BPM | WEIGHT: 260 LBS | OXYGEN SATURATION: 98 % | DIASTOLIC BLOOD PRESSURE: 92 MMHG | BODY MASS INDEX: 39.4 KG/M2 | HEIGHT: 68 IN | SYSTOLIC BLOOD PRESSURE: 172 MMHG

## 2024-07-15 DIAGNOSIS — G43.019 MIGRAINE W/OUT AURA, INTRACTABLE, W/OUT STATUS MIGRAINOSUS: ICD-10-CM

## 2024-07-15 PROCEDURE — G2211 COMPLEX E/M VISIT ADD ON: CPT

## 2024-07-15 PROCEDURE — 99213 OFFICE O/P EST LOW 20 MIN: CPT

## 2024-11-11 ENCOUNTER — APPOINTMENT (OUTPATIENT)
Dept: NEUROLOGY | Facility: CLINIC | Age: 59
End: 2024-11-11
Payer: MEDICARE

## 2024-11-11 VITALS
OXYGEN SATURATION: 97 % | HEART RATE: 67 BPM | BODY MASS INDEX: 39.4 KG/M2 | HEIGHT: 68 IN | WEIGHT: 260 LBS | SYSTOLIC BLOOD PRESSURE: 184 MMHG | DIASTOLIC BLOOD PRESSURE: 93 MMHG

## 2024-11-11 DIAGNOSIS — R29.898 OTHER SYMPTOMS AND SIGNS INVOLVING THE MUSCULOSKELETAL SYSTEM: ICD-10-CM

## 2024-11-11 DIAGNOSIS — M54.12 RADICULOPATHY, CERVICAL REGION: ICD-10-CM

## 2024-11-11 DIAGNOSIS — M19.90 UNSPECIFIED OSTEOARTHRITIS, UNSPECIFIED SITE: ICD-10-CM

## 2024-11-11 PROCEDURE — 99205 OFFICE O/P NEW HI 60 MIN: CPT

## 2024-12-11 ENCOUNTER — APPOINTMENT (OUTPATIENT)
Dept: INTERNAL MEDICINE | Facility: CLINIC | Age: 59
End: 2024-12-11

## 2024-12-11 ENCOUNTER — APPOINTMENT (OUTPATIENT)
Dept: FAMILY MEDICINE | Facility: CLINIC | Age: 59
End: 2024-12-11

## 2024-12-12 ENCOUNTER — APPOINTMENT (OUTPATIENT)
Dept: FAMILY MEDICINE | Facility: CLINIC | Age: 59
End: 2024-12-12

## 2025-02-24 ENCOUNTER — APPOINTMENT (OUTPATIENT)
Dept: NEUROLOGY | Facility: CLINIC | Age: 60
End: 2025-02-24

## 2025-05-22 ENCOUNTER — APPOINTMENT (OUTPATIENT)
Dept: FAMILY MEDICINE | Facility: CLINIC | Age: 60
End: 2025-05-22
Payer: MEDICARE

## 2025-05-22 VITALS
RESPIRATION RATE: 14 BRPM | WEIGHT: 260 LBS | SYSTOLIC BLOOD PRESSURE: 176 MMHG | DIASTOLIC BLOOD PRESSURE: 98 MMHG | BODY MASS INDEX: 39.4 KG/M2 | HEART RATE: 60 BPM | HEIGHT: 68 IN | OXYGEN SATURATION: 98 %

## 2025-05-22 VITALS — DIASTOLIC BLOOD PRESSURE: 86 MMHG | SYSTOLIC BLOOD PRESSURE: 140 MMHG

## 2025-05-22 DIAGNOSIS — I10 ESSENTIAL (PRIMARY) HYPERTENSION: ICD-10-CM

## 2025-05-22 DIAGNOSIS — E78.5 HYPERLIPIDEMIA, UNSPECIFIED: ICD-10-CM

## 2025-05-22 DIAGNOSIS — E66.01 MORBID (SEVERE) OBESITY DUE TO EXCESS CALORIES: ICD-10-CM

## 2025-05-22 DIAGNOSIS — Q74.0 OTHER CONGENITAL MALFORMATIONS OF UPPER LIMB(S), INCLUDING SHOULDER GIRDLE: ICD-10-CM

## 2025-05-22 PROCEDURE — G2211 COMPLEX E/M VISIT ADD ON: CPT

## 2025-05-22 PROCEDURE — 99204 OFFICE O/P NEW MOD 45 MIN: CPT

## 2025-05-30 ENCOUNTER — APPOINTMENT (OUTPATIENT)
Dept: ORTHOPEDIC SURGERY | Facility: CLINIC | Age: 60
End: 2025-05-30
Payer: MEDICARE

## 2025-05-30 VITALS — BODY MASS INDEX: 38.65 KG/M2 | WEIGHT: 255 LBS | HEIGHT: 68 IN

## 2025-05-30 DIAGNOSIS — R22.31 LOCALIZED SWELLING, MASS AND LUMP, RIGHT UPPER LIMB: ICD-10-CM

## 2025-05-30 PROCEDURE — 99204 OFFICE O/P NEW MOD 45 MIN: CPT | Mod: 57

## 2025-06-02 PROBLEM — R22.31 MASS OF FINGER OF RIGHT HAND: Status: ACTIVE | Noted: 2025-06-02
